# Patient Record
Sex: FEMALE | Race: WHITE | URBAN - METROPOLITAN AREA
[De-identification: names, ages, dates, MRNs, and addresses within clinical notes are randomized per-mention and may not be internally consistent; named-entity substitution may affect disease eponyms.]

---

## 2018-02-01 ENCOUNTER — HOSPITAL ENCOUNTER (EMERGENCY)
Facility: CLINIC | Age: 58
Discharge: LEFT BEFORE DISCHARGE | End: 2018-02-01
Payer: MEDICARE

## 2018-02-01 VITALS
OXYGEN SATURATION: 96 % | WEIGHT: 203 LBS | DIASTOLIC BLOOD PRESSURE: 65 MMHG | SYSTOLIC BLOOD PRESSURE: 108 MMHG | RESPIRATION RATE: 20 BRPM | TEMPERATURE: 97.3 F | HEART RATE: 118 BPM

## 2018-02-01 RX ORDER — OXYCODONE HCL 5 MG/5 ML
5 SOLUTION, ORAL ORAL EVERY 8 HOURS PRN
COMMUNITY

## 2018-02-01 RX ORDER — ERGOCALCIFEROL 1.25 MG/1
50000 CAPSULE, LIQUID FILLED ORAL WEEKLY
COMMUNITY

## 2018-02-01 RX ORDER — ARIPIPRAZOLE ORAL 1 MG/ML
5 SOLUTION ORAL DAILY
COMMUNITY

## 2018-02-01 RX ORDER — PROMETHAZINE HYDROCHLORIDE 25 MG/1
TABLET ORAL EVERY 6 HOURS PRN
COMMUNITY

## 2018-02-01 NOTE — ED NOTES
Pt presents having c/o's of bilateral low back pain. Pt reports having an extensive surgical hx. Pt is A&O, ABC's intact.

## 2018-03-31 ENCOUNTER — HEALTH MAINTENANCE LETTER (OUTPATIENT)
Age: 58
End: 2018-03-31

## 2018-04-05 ENCOUNTER — PRE VISIT (OUTPATIENT)
Dept: UROLOGY | Facility: CLINIC | Age: 58
End: 2018-04-05

## 2018-04-05 NOTE — TELEPHONE ENCOUNTER
PREVISIT INFORMATION                                                    Lisbet Sanchez scheduled for future visit at [facility] specialty clinics.    Patient is scheduled to see Eileen Rodríguez PA-C on 4/13/18  Reason for visit: Recurrent UTI's  Referring provider: None  Has patient seen previous specialist? Pt currently hospitalized at [facility]  Medical Records:  [facility] ([facility])    REVIEW                                                      New patient packet mailed to patient: Yes  Medication reconciliation complete: No      PLAN/FOLLOW-UP NEEDED                                                      Patient Reminders Given:    Informed patient to bring an updated list of allergies, medications, pharmacy details and insurance information. Directed patient to come to the 2nd floor, check-in D for their appointment. Informed patient to call back if appointment needs to be cancelled or rescheduled at [phone].    Reminded patient to bring any outside records regarding this appointment or have them faxed to clinic at [phone].    Reminded patient to complete and bring in urology questionnaire. Also for patient to please come with a full bladder and to ask , if early to get staff member for sample.

## 2018-04-13 ENCOUNTER — OFFICE VISIT (OUTPATIENT)
Dept: UROLOGY | Facility: CLINIC | Age: 58
End: 2018-04-13
Payer: COMMERCIAL

## 2018-04-13 VITALS — DIASTOLIC BLOOD PRESSURE: 58 MMHG | SYSTOLIC BLOOD PRESSURE: 93 MMHG | HEART RATE: 65 BPM | OXYGEN SATURATION: 98 %

## 2018-04-13 DIAGNOSIS — R39.15 URINARY URGENCY: ICD-10-CM

## 2018-04-13 DIAGNOSIS — N39.41 URGE INCONTINENCE: Primary | ICD-10-CM

## 2018-04-13 DIAGNOSIS — N39.0 RECURRENT UTI: ICD-10-CM

## 2018-04-13 DIAGNOSIS — R35.0 URINARY FREQUENCY: ICD-10-CM

## 2018-04-13 LAB
ALBUMIN UR-MCNC: NEGATIVE MG/DL
APPEARANCE UR: CLEAR
BILIRUB UR QL STRIP: NEGATIVE
COLOR UR AUTO: NORMAL
GLUCOSE UR STRIP-MCNC: NEGATIVE MG/DL
HGB UR QL STRIP: NEGATIVE
KETONES UR STRIP-MCNC: NEGATIVE MG/DL
LEUKOCYTE ESTERASE UR QL STRIP: NEGATIVE
NITRATE UR QL: NEGATIVE
PH UR STRIP: 7 PH (ref 5–7)
SOURCE: NORMAL
SP GR UR STRIP: 1.01 (ref 1–1.03)
UROBILINOGEN UR STRIP-MCNC: NORMAL MG/DL (ref 0–2)

## 2018-04-13 PROCEDURE — 51798 US URINE CAPACITY MEASURE: CPT | Performed by: PHYSICIAN ASSISTANT

## 2018-04-13 PROCEDURE — 87109 MYCOPLASMA: CPT | Performed by: PHYSICIAN ASSISTANT

## 2018-04-13 PROCEDURE — 81003 URINALYSIS AUTO W/O SCOPE: CPT | Performed by: PHYSICIAN ASSISTANT

## 2018-04-13 PROCEDURE — 99203 OFFICE O/P NEW LOW 30 MIN: CPT | Mod: 25 | Performed by: PHYSICIAN ASSISTANT

## 2018-04-13 ASSESSMENT — PAIN SCALES - GENERAL: PAINLEVEL: EXTREME PAIN (8)

## 2018-04-13 NOTE — NURSING NOTE
Lisbet Sanchez's goals for this visit include:   Chief Complaint   Patient presents with     Consult     2nd opinion- Bladder infection       She requests these members of her care team be copied on today's visit information: Yes    PCP: Aracely Nguyen    Referring Provider:  No referring provider defined for this encounter.    Chief Complaint   Patient presents with     Consult     2nd opinion- Bladder infection       Initial BP 93/58 (BP Location: Right arm, Patient Position: Sitting, Cuff Size: Adult Regular)  Pulse 65  SpO2 98% There is no height or weight on file to calculate BMI.  Medication Reconciliation: complete    Do you need any medication refills at today's visit? No    post void residual  0mL

## 2018-04-13 NOTE — LETTER
Patient:  Lisbet Sanchez  :   1960  MRN:     [id number]        Ms.Collene CASTRO Sanchez  [address] [place] [id number]        2018    Dear ,    We are writing to inform you of your test results.  Your additional urine cultures (ureaplasma and mycoplasma) are negative.  Please call with any additional questions or concerns.    Resulted Orders   UA reflex to Microscopic and Culture   Result Value Ref Range    Color Urine Light Yellow     Appearance Urine Clear     Glucose Urine Negative NEG^Negative mg/dL    Bilirubin Urine Negative NEG^Negative    Ketones Urine Negative NEG^Negative mg/dL    Specific Gravity Urine 1.008 1.003 - 1.035    Blood Urine Negative NEG^Negative    pH Urine 7.0 5.0 - 7.0 pH    Protein Albumin Urine Negative NEG^Negative mg/dL    Urobilinogen mg/dL Normal 0.0 - 2.0 mg/dL    Nitrite Urine Negative NEG^Negative    Leukocyte Esterase Urine Negative NEG^Negative    Source Midstream Urine    Mycoplasma large colony culture   Result Value Ref Range    Specimen Description Unspecified Urine     Special Requests Specimen received in [employer]     Culture Micro No large colony Mycoplasma species isolated    Ureaplasma culture   Result Value Ref Range    Specimen Description Unspecified Urine     Special Requests Specimen received in [employer]     Culture Micro No Ureaplasma species isolated

## 2018-04-13 NOTE — PROGRESS NOTES
CC: urinary frequency, urgency, incontinence    HPI: It was my pleasure to see Ms. Lisbet Sanchez, a 57 year old year old female with PMH of chronic back pain s/p lumbar fusions, nerve ablations, and hip injections, maintained on high daily dose narcotic therapy, who presents via self-referral for evaluation and second opinion of the above. She previously followed with [facility] urology. From review of Care Everywhere records and history obtained from patient, summary of Ms. Sanchez's urologic history is as follows:    -h/o mid-urethral sling in 2015 for MYKEL   -c/o urgency and UUI postop; maintained on oxybutynin (failed several other anticholinergics)  -Hospitalization 02/01/2018 - 02/05/2018 for c/o back pain, nausea, vomiting and diarrhea. Developed worsening urgency and urge incontinence during admission. Found to have a UTI. UC ultimately grew out pansensitive E coli. Treated with Levaquin. Repeat UA negative.  -Worsening urgency/UUI and well as dysuria since leaving the hospital. Tried increased oxybutynin to 10 mg BID but developed obstructive voiding symptoms (PVR in clinic was 12 cc).   -Cystoscopy in March 2018 showed a red spot in the bladder which was biopsied. Pathology was negative for malignancy.   -Recommended to undergo UDS and consider Botox pending results. Scheduled in the next few weeks with [facility].  -Also started on daily low dose nitrofurantion for UTI prophylaxis.   -Another hospitalization 3/29/18-4/12/18 for acute on chronic back pain with question for saddle anesthesia. MRI showed new severe spinal canal narrowing and new severe left lateral recess narrowing at L5-S1 region. Underwent L5 hemilaminectomy with farminotomy and decompression. Currently residing in a TCU. She reports no change in urinary symptoms since spine surgery.  -Urine cytology on 3/20/18 negative for high grade urothelial carcinoma.  -UC 4/3/18 with no growth    Here today for a second opinion.  Has ongoing urinary frequency (voiding nearly every 30 minutes), urgency, and sudden large volume urge incontinence. Also with recent onset of nocturnal enuresis. Has no control over her bladder.   Reports that oxybutynin, pyridium, and nitrofurantoin were discontinued while hospitalized and she is not currently taking any of these. Did not feel that oxybutynin was helping anyway.   Reports having UDS over 1 year ago.     No past medical history on file.   Fever 04/05/2018   Spinal stenosis of lumbar region with radiculopathy 04/01/2018   Opioid dependence with opioid-induced mood disorder (HRC) 03/31/2018   Hepatic steatosis (HRC) 03/31/2018   Exacerbation of chronic back pain 03/31/2018   Vulvar pain 03/31/2018   Saddle anesthesia 03/30/2018   Left flank pain 03/20/2018   Urinary incontinence without sensory awareness 03/20/2018   Antibiotic-associated diarrhea 03/20/2018   Dermatochalasis of eyelid 03/09/2018   Overview:     Added automatically from request for surgery [id number]     Visual field defect 03/09/2018   Overview:     Added automatically from request for surgery [id number]     Constipation 02/10/2018   Generalized weakness 02/08/2018   Nausea 02/07/2018   Left ventricular outflow tract obstruction 02/07/2018   Troponin level elevated 02/01/2018   Chronic narcotic use 02/01/2018   Enteritis 02/01/2018   Trochanteric bursitis of both hips 12/22/2017   Postablative hypothyroidism 03/01/2016   Overview:     2004, for toxic multinodular goiter       Mixed stress and urge urinary incontinence 03/01/2016   Controlled substance agreement signed 03/22/2015   Overview:     Diagnosis: Chronic back pain, anxiety  Medication: MS Contin 60 mg tabs #60 per month, oxycodone 5 mg #90 per month, 1 mg clonazepam #60 per month  Controlled Substance Agreement reviewed and signed: yes   Date agreement signed: 3/16/2015   Refill plan: refill monthly; next office visit due August 2018  Clinician: MD Buster Mark  09/27/2013   Blepharochalasis of left upper eyelid 09/27/2013   HTN (hypertension) ([facility]) 04/30/2013   Headache, migraine 10/02/2012   Vitamin D deficiency ([facility]) 03/30/2010   Major depressive disorder, recurrent episode, moderate ([facility]) 11/13/2008   Overview:     Depression Major Recurrent Moderate     Anxiety state ([facility]) 11/13/2008   Overview:     Anxiety NOS     Obesity (BMI 30-39.9) ([facility]) 10/24/2006   Chronic bilateral low back pain ([facility]) 06/07/2003   Overview:     LW Modifier: s/p lumbar fusion  ; Pain Low Back           No past surgical history on file.   Surgery Date Laterality Comments   HYSTERECTOMY 1997       BACK SURGERY 2000, 2010       KNEE SURGERY         HIP SURGERY         INCONTINENCE SURGERY 1997   sling removal 1998   SALPINGO-OOPHORECTOMY 2011   for benign ovarian mass         FAMILY HISTORY: Denies family history of urologic cancer.     SOCIAL HISTORY: non-smoker, minimal alcohol.   reports that she has never smoked. She has never used smokeless tobacco.    Current Outpatient Prescriptions   Medication Sig Dispense Refill     ARIPiprazole (ABILIFY) 1 MG/ML SOLN solution Take 5 mg by mouth daily       Calcium carb-Vitamin D 500 mg [place]-200 units (OSCAL WITH D;OYSTER SHELL CALCIUM) 500-200 MG-UNIT per tablet Take 1 tablet by mouth 2 times daily (with meals)       calcium-vitamin D 500-125 MG-UNIT TABS Take 2,000 Units by mouth daily       ClonazePAM (KLONOPIN PO) Take 1 mg by mouth as needed for anxiety       CYANOCOBALAMIN PO Take 500 mcg by mouth daily       Docusate Sodium (COLACE PO) Take 100 mg by mouth 2 times daily       DULoxetine HCl (CYMBALTA PO) Take 60 mg by mouth daily       vitamin D (ERGOCALCIFEROL) 70674 UNIT capsule Take 50,000 Units by mouth once a week       Gabapentin (NEURONTIN PO) Take 600 mg by mouth 3 times daily       Levothyroxine Sodium (SYNTHROID PO) Take 175 mcg by mouth       Morphine Sulfate (MS CONTIN PO) Take 60 mg by mouth every 12 hours       Naproxen Sodium  (ANAPROX PO) Take 220 mg by mouth daily       oxyCODONE (ROXICODONE) 5 MG/5ML solution Take 5 mg by mouth every 8 hours as needed for moderate to severe pain       promethazine (PHENERGAN) 25 MG tablet Take by mouth every 6 hours as needed for nausea       SUMAtriptan Succinate (IMITREX PO) Take 100 mg by mouth as needed for migraine       Doxycycline Monohydrate (ADOXA PO) Take 100 mg by mouth       LACTULOSE PO Take 10 g by mouth 3 times daily       Lisinopril (ZESTRIL PO) Take 10 mg by mouth       Oxybutynin Chloride (DITROPAN XL PO) Take 5 mg by mouth         ALLERGIES: Keflex [cephalexin]      REVIEW OF SYSTEMS:  A 14 point review of systems was obtained and was positive for back pain, leg pain, HTN, chronic opioid use and otherwise negative aside from that mentioned above in the HPI.       GENERAL PHYSICAL EXAM:   Vitals: BP 93/58 (BP Location: Right arm, Patient Position: Sitting, Cuff Size: Adult Regular)  Pulse 65  SpO2 98%  There is no height or weight on file to calculate BMI.  GENERAL: Well groomed, well developed, well nourished female in NAD resting comfortably in a wheelchair.  HEAD: Normocephalic. Atraumatic.  RESPIRATORY: No increased respiratory effort.   GI: Soft, NT, ND, no palpable masses.  No CVAT bilaterally.  MS: Full ROM in extremities, gait normal, normal muscle tone  SKIN: Warm to touch, dry.  No visible rashes or lesions on examined areas.  NEURO: Alert and oriented x 3.  PSYCH: Normal mood and affect, pleasant and agreeable during interview and exam.  PELVIC:  Deferred for now.        PVR: Residual urine by ultrasound was 0 ml.      UA today is completely negative.     RADIOLOGY: The following tests were reviewed:   CT Chest, Abdomen, Pelvis w/IV Contrast   4/6/18   (via Care Everywhere)  IMPRESSION:    1. New area of groundglass infiltrate right lung.  2. Postsurgical changes lumbar spine including fluid collection and air as described above. Most likely postsurgical. Infection not  excluded.    LABS: The last test results for Ms. Lisbet Sanchez were reviewed.   Cr - 0.67   4/7/18   (via Care Everywhere)  Urine cytology on 3/20/18 negative for high grade urothelial carcinoma.  UC 4/3/18 with no growth    ASSESSMENT:   57 year old female with chronic back pain, most recently s/p L5 hemilaminectomy with foraminotomy and decompression, and severe urinary frequency, urgency, and urge incontinence which has been refractory to multiple medications. She was recommended by [facility] urology to undergo UDS and consider possible Botox pending results. Looking for second opinion. UA today is completely negative and PVR is 0 mL so she appears to be emptying adequately.    PLAN:   We discussed that given no evidence for infection currently, her symptoms are most likely secondary to OAB with possible neurogenic component from multiple spine surgeries. Also in the differential is incomplete bladder emptying with overflow incontinence 2/2 chronic narcotic pain medication. Agree with recommendation to repeat UDS. She has this scheduled with [facility] in the next few weeks, but requests to schedule with the [facility] as she is considering transferring her urology care.  -Schedule UDS next available. She will call to cancel if she decides to stick with [facility].   -Will send urine for ureaplasma/mycoplasma to rule out atypicals.     20 minutes spent with the patient, >50% in counseling and coordination of care.    Eileen Rodríguez PA-C  Department of Urology

## 2018-04-13 NOTE — PATIENT INSTRUCTIONS
URODYNAMIC TESTING      Where should I go for this test?  o The procedure is performed at:     [facility]   [address] [place] [id number]   Floor 4    o If you have questions about your test, please call our nurse triage line at [phone], option #2. If you need to cancel or reschedule your test for any reason, please notify us as soon as possible.    o Please check in approximately 15 minutes prior to your procedure time.        What is urodynamic testing?   o Urodynamic testing refers to a group of tests used to assess bladder function by measuring various aspects of urine storage and emptying. The test takes about 75 minutes. For most patients, the test is not painful.       How should I get ready for this test?  o Please do not drink anything for 2 hours prior to your test. If you need to take medication it is okay to have sips of water with your medicines.    o Please try to arrive with a comfortably full bladder if possible because you will be asked to try and urinate prior to your study.  o If you received a bladder diary, please complete this prior to your urodynamic test and bring it with you to your appointment. A bladder diary measures how much fluid you are drinking and how often and how much you are urinating. You can also record any urinary leakage that may have occurred and what you were doing when you leaked.    o If you have chronic constipation, please take stool softeners for two days before your test.      What happens during the test?  o You will first be asked to empty your bladder in a private restroom into a special toilet called a uroflow machine which measures the rate of urine flow.  o A nurse will then place a very small tube (called a catheter) into your bladder. This drains any urine left over after urinating and also measures the pressures inside of your bladder during your test. Another small catheter will then be  placed into your rectum to measure abdominal pressures. Two small sticky patches will be placed on the skin near your anus to measure pelvic floor function.   o We will then instill contrast dye into your bladder through the bladder catheter. The contrast is very dense and will allow us to take x-ray pictures of your bladder intermittently during your test.  o You will be asked to tell us when you first start to feel like your bladder is filling up, when you have moderate urgency to urinate, when you have very strong urgency to urinate and finally when you feel that your bladder is full. Once you feel full you will be asked to try and urinate.    o You may be asked to cough or bear down several times during your procedure. The provider running your study will be looking for urine leakage during this time.        What happens after the test?  o The provider running your urodynamic study will share the results of your test on the day of your procedure or very soon after.  o After your test, you may go about your day as normal. You may notice some blood in your urine for a couple of days which should clear up on its own. You may also feel a more urgent need to use the toilet or you may need to go more often - this is due to having a catheter placed and should resolve on its own in a few days.

## 2018-04-13 NOTE — MR AVS SNAPSHOT
After Visit Summary   4/13/2018    Lisbet Sanchez    MRN: [id number]           Patient Information     Date Of Birth          1960        Visit Information        Provider Department      4/13/2018 1:00 PM Eileen Rodríguez PA-C [facility]        Today's Diagnoses     Urinary incontinence, unspecified type    -  1      Care Instructions    URODYNAMIC TESTING      Where should I go for this test?  o The procedure is performed at:     [facility]   [address] [place] [address]   Floor 4    o If you have questions about your test, please call our nurse triage line at [phone], option #2. If you need to cancel or reschedule your test for any reason, please notify us as soon as possible.    o Please check in approximately 15 minutes prior to your procedure time.        What is urodynamic testing?   o Urodynamic testing refers to a group of tests used to assess bladder function by measuring various aspects of urine storage and emptying. The test takes about 75 minutes. For most patients, the test is not painful.       How should I get ready for this test?  o Please do not drink anything for 2 hours prior to your test. If you need to take medication it is okay to have sips of water with your medicines.    o Please try to arrive with a comfortably full bladder if possible because you will be asked to try and urinate prior to your study.  o If you received a bladder diary, please complete this prior to your urodynamic test and bring it with you to your appointment. A bladder diary measures how much fluid you are drinking and how often and how much you are urinating. You can also record any urinary leakage that may have occurred and what you were doing when you leaked.    o If you have chronic constipation, please take stool softeners for two days before your test.      What happens during the test?  o You will  first be asked to empty your bladder in a private restroom into a special toilet called a uroflow machine which measures the rate of urine flow.  o A nurse will then place a very small tube (called a catheter) into your bladder. This drains any urine left over after urinating and also measures the pressures inside of your bladder during your test. Another small catheter will then be placed into your rectum to measure abdominal pressures. Two small sticky patches will be placed on the skin near your anus to measure pelvic floor function.   o We will then instill contrast dye into your bladder through the bladder catheter. The contrast is very dense and will allow us to take x-ray pictures of your bladder intermittently during your test.  o You will be asked to tell us when you first start to feel like your bladder is filling up, when you have moderate urgency to urinate, when you have very strong urgency to urinate and finally when you feel that your bladder is full. Once you feel full you will be asked to try and urinate.    o You may be asked to cough or bear down several times during your procedure. The provider running your study will be looking for urine leakage during this time.        What happens after the test?  o The provider running your urodynamic study will share the results of your test on the day of your procedure or very soon after.  o After your test, you may go about your day as normal. You may notice some blood in your urine for a couple of days which should clear up on its own. You may also feel a more urgent need to use the toilet or you may need to go more often - this is due to having a catheter placed and should resolve on its own in a few days.            Follow-ups after your visit        Who to contact     If you have questions or need follow up information about today's clinic visit or your schedule please contact [facility] directly at [phone].  Normal or  non-critical lab and imaging results will be communicated to you by [employer], letter or phone within 4 business days after the clinic has received the results. If you do not hear from us within 7 days, please contact the clinic through [employer] (Closed)t or phone. If you have a critical or abnormal lab result, we will notify you by phone as soon as possible.  Submit refill requests through [employer] or call your pharmacy and they will forward the refill request to us. Please allow 3 business days for your refill to be completed.          Additional Information About Your Visit        [employer] Information     [employer] is an electronic gateway that provides easy, online access to your medical records. With [employer], you can request a clinic appointment, read your test results, renew a prescription or communicate with your care team.     To sign up for [employer] visit the website at [email or web address]   You will be asked to enter the access code listed below, as well as some personal information. Please follow the directions to create your username and password.     Your access code is: [email or web address]  Expires: 2018  1:59 PM     Your access code will  in 90 days. If you need help or a new code, please contact your [facility] or call [phone] for assistance.        Care EveryWhere ID     This is your Care EveryWhere ID. This could be used by other organizations to access your [facility] medical records  [id number]        Your Vitals Were     Pulse Pulse Oximetry                65 98%           Blood Pressure from Last 3 Encounters:   18 93/58   18 108/65    Weight from Last 3 Encounters:   18 92.1 kg (203 lb)              We Performed the Following     MEASURE POST-VOID RESIDUAL URINE/BLADDER CAPACITY, US NON-IMAGING     Mycoplasma large colony culture     UA reflex to Microscopic and Culture     Ureaplasma culture        Primary Care Provider Office Phone # Fax #     Aracely Nguyen [phone]       [facility] [address] [place] [address]        Equal Access to Services     DEMETRIA LONG : Hadii aad ku hadturnero Sorayneali, waaxda luqadaha, qaybta kaalmada adeegyada, oscar thibodeaux. So [facility] [phone].    ATENCIÓN: Si habla español, tiene a stallings disposición servicios gratuitos de asistencia lingüística. Llame al [phone].    We comply with applicable federal civil rights laws and [place] laws. We do not discriminate on the basis of race, color, national origin, age, disability, sex, sexual orientation, or gender identity.            Thank you!     Thank you for choosing [facility]  for your care. Our goal is always to provide you with excellent care. Hearing back from our patients is one way we can continue to improve our services. Please take a few minutes to complete the written survey that you may receive in the mail after your visit with us. Thank you!             Your Updated Medication List - Protect others around you: Learn how to safely use, store and throw away your medicines at www.disposemymeds.org.          This list is accurate as of 4/13/18  1:59 PM.  Always use your most recent med list.                   Brand Name Dispense Instructions for use Diagnosis    ADOXA PO      Take 100 mg by mouth        ANAPROX PO      Take 220 mg by mouth daily        ARIPiprazole 1 MG/ML Soln solution    ABILIFY     Take 5 mg by mouth daily        Calcium carb-Vitamin D 500 mg [place]-200 units 500-200 MG-UNIT per tablet    OSCAL with D;Oyster Shell Calcium     Take 1 tablet by mouth 2 times daily (with meals)        calcium-vitamin D 500-125 MG-UNIT Tabs      Take 2,000 Units by mouth daily        COLACE PO      Take 100 mg by mouth 2 times daily        CYANOCOBALAMIN PO      Take 500 mcg by mouth daily        CYMBALTA PO      Take 60 mg by mouth daily        DITROPAN XL PO      Take 5 mg by mouth         IMITREX PO      Take 100 mg by mouth as needed for migraine        KLONOPIN PO      Take 1 mg by mouth as needed for anxiety        LACTULOSE PO      Take 10 g by mouth 3 times daily        MS CONTIN PO      Take 60 mg by mouth every 12 hours        NEURONTIN PO      Take 600 mg by mouth 3 times daily        oxyCODONE 5 MG/5ML solution    ROXICODONE     Take 5 mg by mouth every 8 hours as needed for moderate to severe pain        promethazine 25 MG tablet    PHENERGAN     Take by mouth every 6 hours as needed for nausea        SYNTHROID PO      Take 175 mcg by mouth        vitamin D 55658 UNIT capsule    ERGOCALCIFEROL     Take 50,000 Units by mouth once a week        ZESTRIL PO      Take 10 mg by mouth

## 2018-04-20 LAB
BACTERIA SPEC CULT: NORMAL
BACTERIA SPEC CULT: NORMAL
Lab: NORMAL
Lab: NORMAL
SPECIMEN SOURCE: NORMAL
SPECIMEN SOURCE: NORMAL

## 2018-05-17 ENCOUNTER — PRE VISIT (OUTPATIENT)
Dept: UROLOGY | Facility: CLINIC | Age: 58
End: 2018-05-17

## 2019-04-03 ENCOUNTER — HOSPITAL ENCOUNTER (EMERGENCY)
Facility: CLINIC | Age: 59
Discharge: HOME | End: 2019-04-03
Attending: EMERGENCY MEDICINE | Admitting: EMERGENCY MEDICINE
Payer: COMMERCIAL

## 2019-04-03 VITALS
OXYGEN SATURATION: 97 % | SYSTOLIC BLOOD PRESSURE: 152 MMHG | HEART RATE: 60 BPM | RESPIRATION RATE: 16 BRPM | BODY MASS INDEX: 34.84 KG/M2 | HEIGHT: 64 IN | TEMPERATURE: 98.1 F | DIASTOLIC BLOOD PRESSURE: 77 MMHG

## 2019-04-03 DIAGNOSIS — M54.41 ACUTE RIGHT-SIDED LOW BACK PAIN WITH RIGHT-SIDED SCIATICA: ICD-10-CM

## 2019-04-03 PROCEDURE — 25000128 H RX IP 250 OP 636: Performed by: EMERGENCY MEDICINE

## 2019-04-03 PROCEDURE — 99285 EMERGENCY DEPT VISIT HI MDM: CPT | Mod: 25

## 2019-04-03 PROCEDURE — 96372 THER/PROPH/DIAG INJ SC/IM: CPT

## 2019-04-03 RX ORDER — PREDNISONE 10 MG/1
TABLET ORAL
Qty: 32 TABLET | Refills: 0 | Status: SHIPPED | OUTPATIENT
Start: 2019-04-03

## 2019-04-03 RX ADMIN — HYDROMORPHONE HYDROCHLORIDE 1 MG: 1 INJECTION, SOLUTION INTRAMUSCULAR; INTRAVENOUS; SUBCUTANEOUS at 15:53

## 2019-04-03 ASSESSMENT — ENCOUNTER SYMPTOMS
BACK PAIN: 1
NUMBNESS: 0
WEAKNESS: 0

## 2019-04-03 NOTE — ED AVS SNAPSHOT
Emergency Department  [address] [place] [address]  Phone:  [phone]  Fax:  [phone]                                    Lisbet Sanchez   MRN: [id number]    Department:   Emergency Department   Date of Visit:  4/3/2019           After Visit Summary Signature Page    I have received my discharge instructions, and my questions have been answered. I have discussed any challenges I see with this plan with the nurse or doctor.    ..........................................................................................................................................  Patient/Patient Representative Signature      ..........................................................................................................................................  Patient Representative Print Name and Relationship to Patient    ..................................................               ................................................  Date                                   Time    ..........................................................................................................................................  Reviewed by Signature/Title    ...................................................              ..............................................  Date                                               Time          22EPIC Rev 08/18

## 2019-04-03 NOTE — ED PROVIDER NOTES
History     Chief Complaint:  Right leg pain    The history is provided by the patient.      Lisbet Sanchez is a 58 year old female with a history of HTN, chronic back pain, chronic narcotic use, controlled substance agreement signed,and several back surgeries who presents to the emergency department for evaluation of right leg pain. Five days ago while helping her mother try on some pants in a store, she bent over and states she had the onset back pain that shoots down into her right buttocks and right leg. She thinks she has pinched a nerve. She has a history of back pain and does have a spinal stimulator in place, which helps with the back pain but she is concerned and uncomfortable with the pain in her right leg. This prompted the patient to seek evaluation here in the emergency department. She denies loss of bladder or bowel control. No saddle paresthesias. She denies any numbness or weakness. She has been able to ambulate but does have some pain with this. No abdominal pain. To note, the patient does take 3 tablets of her morphine sulfate prescribed through her pain clinic daily. She has her next appointment on April 29.     Allergies:  Cephalexin  Vancomycin     Medications:    Abilify  Vitamin D  Klonopin  Cyanocobalamin  Colace  Adoxa  Cymbalta  Gabapentin  Lactulose  Synthroid   Lisinopril  Morphine  Oxycodone  Promethazine  Imitrex  Ambien  Promethazine  Medi-phedrine    Past Medical History:    UTI  Chronic back pain  Ovarian cyst  Hypothyroidism  Depression  HTN  Toxic multinodular goiter without mention of thyroid crisis or storm  Migraines  Anxiety  KEITH  DDD  Central sleep apnea  Spinal abscess  Spinal stenosis  Chronic narcotic use  Controlled substance agreement signed  Kidney stones  Acute pulmonary edema    Past Surgical History:    Lumbar fusion  Lumbar laminectomy    Tubal ligation  Hysterectomy  Knee replacement  Bilateral salpingo-oophorectomy    Family History:    Breast  "cancer  Colon cancer    Social History:  Negative for tobacco use.  Positive for alcohol use.  Negative for drug use.  Marital Status:        Review of Systems   Gastrointestinal:        Negative for loss of bowel.   Genitourinary:        Negative for loss of bladder control.   Musculoskeletal: Positive for back pain.        Positive for right leg pain.   Neurological: Negative for weakness and numbness.   All other systems reviewed and are negative.    Physical Exam     Patient Vitals for the past 24 hrs:   BP Temp Temp src Pulse Resp SpO2 Height   04/03/19 1256 152/77 98.1  F (36.7  C) Oral 60 16 97 % 1.626 m (5' 4\")     Physical Exam  Eye:  Pupils are equal, round, and reactive.  Extraocular movements intact.    ENT:  No rhinorrhea.  Moist mucus membranes.  Normal tongue and tonsil.    Cardiac:  Regular rate and rhythm.  No murmurs, gallops, or rubs.    Pulmonary:  Clear to auscultation bilaterally.  No wheezes, rales, or rhonchi.    Abdomen:  Positive bowel sounds.  Abdomen is soft and non-distended, without focal tenderness.    Musculoskeletal:  No midline tenderness to the spine.  There is focal tenderness along the right side of the lower back with extension into the right buttock and sciatic notch.  - straight leg raise on the left, + increase in pain with straight leg raise on the right.     Skin:  Warm and dry without rashes.  Feet are equally warm with strong DP pulses.    Neuro:  Normal sensation throughout the legs and perineum.  5/5 strength through the hips/knees/ankles.  2+ patellar reflexes.  Normal gait.    Psych:  Normal affect with appropriate interaction with examiner.        Emergency Department Course   Interventions:  1553 Dilaudid, 1 mg, IV injection    Emergency Department Course:  1520 Nursing notes and vitals reviewed.  I performed an exam of the patient as documented above.     IV inserted. Medicine administered as documented above.    1600 I rechecked the patient and discussed " the results of her workup thus far.     Findings and plan explained to the Patient. Patient discharged home with instructions regarding supportive care, medications, and reasons to return. The importance of close follow-up was reviewed. The patient was prescribed Prednisone.    I personally answered all related questions prior to discharge.   Impression & Plan    Medical Decision Making:  This unfortunate 58-year-old woman with an extensive history of low back pain including multiple surgeries along with management of her pain through a local pain clinic with 3 times daily dosing of oral morphine presents to us with complaints of an exacerbation of this low back pain.  She describes bending down to help her mother try on a pair of pants approximately 5 days ago.  She felt a pull in the low back and since then, she has had increasing constant aching pain in the right lower back and buttock with some aching sensation into the leg.  The patient denies trauma.  She denies weakness or numbness.  She denies incontinence.  On exam, there is no midline tenderness.  With this, I feel there is a very low likelihood for a compression fracture or other type of fracture in the spine and an x-ray would not be indicated.  She states that she called her pain clinic and was sent here for an MRI, though she meets no criteria for a an emergent MRI and we discussed this.  Plan instead will be for conservative management with a course of prednisone, bolus and tapering along with continued medications through her pain clinic.  I gave her a single dose of pain medication here with improvement in symptoms.  She has an appointment with her pain clinic scheduled in 4 weeks and I advised her to call them today to try to move that appointment up as she may benefit from reinstituting physical therapy and possible cortisone injection.  Patient's questions were answered and she is comfortable with the plan for discharge home.  She knows that she  can return at any point if she has worsening of her condition, inability to care for self, or any other emergent concerns.    Diagnosis:    ICD-10-CM    1. Acute right-sided low back pain with right-sided sciatica M54.41      Disposition:  discharged to home    Discharge Medications:     Medication List      Started    predniSONE 10 MG tablet  Commonly known as:  DELTASONE  4 tabs po every day x 3 days, then 3 tabs po every day x 3 days, then 2 tabs po every day x 3 days, then 1 tab po every day x 3 days, then 1/2 tab po every day x 4 days.          Scribe Disclosure:  I, Mary Walter, am serving as a scribe on 4/3/2019 at 3:28 PM to personally document services performed by Trierweiler, Chad A, MD based on my observations and the provider's statements to me.     Mary Walter  4/3/2019    EMERGENCY DEPARTMENT       Trierweiler, Chad A, MD  04/04/19 6295

## 2022-09-02 ENCOUNTER — HOSPITAL ENCOUNTER (EMERGENCY)
Facility: CLINIC | Age: 62
Discharge: HOME | End: 2022-09-02
Payer: MEDICARE

## 2024-12-13 ENCOUNTER — LAB REQUISITION (OUTPATIENT)
Dept: LAB | Facility: CLINIC | Age: 64
End: 2024-12-13
Payer: COMMERCIAL

## 2024-12-13 DIAGNOSIS — I50.32 CHRONIC DIASTOLIC (CONGESTIVE) HEART FAILURE (H): ICD-10-CM

## 2024-12-16 ENCOUNTER — TELEPHONE (OUTPATIENT)
Dept: GERIATRICS | Facility: CLINIC | Age: 64
End: 2024-12-16

## 2024-12-16 DIAGNOSIS — F41.9 ANXIETY: Primary | ICD-10-CM

## 2024-12-16 LAB
ANION GAP SERPL CALCULATED.3IONS-SCNC: 11 MMOL/L (ref 7–15)
BUN SERPL-MCNC: 16.5 MG/DL (ref 8–23)
CALCIUM SERPL-MCNC: 9.2 MG/DL (ref 8.8–10.4)
CHLORIDE SERPL-SCNC: 103 MMOL/L (ref 98–107)
CREAT SERPL-MCNC: 0.66 MG/DL (ref 0.51–0.95)
EGFRCR SERPLBLD CKD-EPI 2021: >90 ML/MIN/1.73M2
ERYTHROCYTE [DISTWIDTH] IN BLOOD BY AUTOMATED COUNT: 14.5 % (ref 10–15)
GLUCOSE SERPL-MCNC: 98 MG/DL (ref 70–99)
HCO3 SERPL-SCNC: 28 MMOL/L (ref 22–29)
HCT VFR BLD AUTO: 30.4 % (ref 35–47)
HGB BLD-MCNC: 9.3 G/DL (ref 11.7–15.7)
MCH RBC QN AUTO: 27.8 PG (ref 26.5–33)
MCHC RBC AUTO-ENTMCNC: 30.6 G/DL (ref 31.5–36.5)
MCV RBC AUTO: 91 FL (ref 78–100)
PLATELET # BLD AUTO: 263 10E3/UL (ref 150–450)
POTASSIUM SERPL-SCNC: 4.5 MMOL/L (ref 3.4–5.3)
RBC # BLD AUTO: 3.34 10E6/UL (ref 3.8–5.2)
SODIUM SERPL-SCNC: 142 MMOL/L (ref 135–145)
WBC # BLD AUTO: 8.2 10E3/UL (ref 4–11)

## 2024-12-16 PROCEDURE — 36415 COLL VENOUS BLD VENIPUNCTURE: CPT | Mod: ORL | Performed by: FAMILY MEDICINE

## 2024-12-16 PROCEDURE — 85027 COMPLETE CBC AUTOMATED: CPT | Mod: ORL | Performed by: FAMILY MEDICINE

## 2024-12-16 PROCEDURE — P9604 ONE-WAY ALLOW PRORATED TRIP: HCPCS | Mod: ORL | Performed by: FAMILY MEDICINE

## 2024-12-16 PROCEDURE — 80048 BASIC METABOLIC PNL TOTAL CA: CPT | Mod: ORL | Performed by: FAMILY MEDICINE

## 2024-12-16 RX ORDER — CLONAZEPAM 0.5 MG/1
0.5 TABLET ORAL 3 TIMES DAILY PRN
COMMUNITY
Start: 2024-12-16 | End: 2024-12-16

## 2024-12-16 RX ORDER — CLONAZEPAM 0.5 MG/1
0.5 TABLET ORAL 3 TIMES DAILY PRN
Qty: 30 TABLET | Refills: 1 | Status: SHIPPED | OUTPATIENT
Start: 2024-12-16

## 2024-12-16 NOTE — TELEPHONE ENCOUNTER
[facility] Triage Nurse Telephone Encounter    Provider: ALEXANDRA Silva  Facility: [facility]  Facility Type:  TCU    Caller: Laura  Call Back Number: [phone]    Allergies:    Allergies   Allergen Reactions    Cephalexin Anaphylaxis, Hives and Rash    Vancomycin Other (See Comments)     neutropenia        Reason for call: Nurse is calling to report that patient has orders for Clonazepam 0.5mg tablet---1 tab TID PRN for anxiety.  Patient did not come from the hospital with a script for this.  Epic has that patient's Clonazepam order is for the ODT tablet.      Verbal Order/Direction given by Provider: Discontinue the order for Clonazepam ODT tablet.  Start Clonazepam 0.5mg tablet---1 tablet TID PRN for anxiety.      Provider giving Order:  ALEXANDRA Silva    Verbal Order given to: Laura Herrera RN

## 2024-12-17 ENCOUNTER — TRANSITIONAL CARE UNIT VISIT (OUTPATIENT)
Dept: GERIATRICS | Facility: CLINIC | Age: 64
End: 2024-12-17
Payer: COMMERCIAL

## 2024-12-17 VITALS
HEART RATE: 74 BPM | OXYGEN SATURATION: 96 % | RESPIRATION RATE: 18 BRPM | DIASTOLIC BLOOD PRESSURE: 74 MMHG | BODY MASS INDEX: 35.05 KG/M2 | HEIGHT: 64 IN | WEIGHT: 205.3 LBS | TEMPERATURE: 97.8 F | SYSTOLIC BLOOD PRESSURE: 148 MMHG

## 2024-12-17 DIAGNOSIS — F41.8 DEPRESSION WITH ANXIETY: ICD-10-CM

## 2024-12-17 DIAGNOSIS — M54.6 CHRONIC BILATERAL THORACIC BACK PAIN: ICD-10-CM

## 2024-12-17 DIAGNOSIS — N32.81 OVERACTIVE BLADDER: ICD-10-CM

## 2024-12-17 DIAGNOSIS — G47.33 OSA (OBSTRUCTIVE SLEEP APNEA): ICD-10-CM

## 2024-12-17 DIAGNOSIS — Z98.890 S/P INSERTION OF INTRATHECAL PUMP: ICD-10-CM

## 2024-12-17 DIAGNOSIS — F11.90 CHRONIC, CONTINUOUS USE OF OPIOIDS: ICD-10-CM

## 2024-12-17 DIAGNOSIS — R29.898 RIGHT LEG WEAKNESS: ICD-10-CM

## 2024-12-17 DIAGNOSIS — G89.29 CHRONIC BILATERAL THORACIC BACK PAIN: ICD-10-CM

## 2024-12-17 DIAGNOSIS — I50.30 HEART FAILURE WITH PRESERVED EJECTION FRACTION, NYHA CLASS I (H): ICD-10-CM
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
## 2024-12-17 DIAGNOSIS — G43.909 MIGRAINE WITHOUT STATUS MIGRAINOSUS, NOT INTRACTABLE, UNSPECIFIED MIGRAINE TYPE: ICD-10-CM

## 2024-12-17 DIAGNOSIS — E78.2 MIXED HYPERLIPIDEMIA: ICD-10-CM

## 2024-12-17 DIAGNOSIS — T40.2X5A OPIOID-INDUCED CONSTIPATION: ICD-10-CM

## 2024-12-17 DIAGNOSIS — I10 HYPERTENSION, UNSPECIFIED TYPE: ICD-10-CM

## 2024-12-17 DIAGNOSIS — E03.9 ACQUIRED HYPOTHYROIDISM: ICD-10-CM

## 2024-12-17 DIAGNOSIS — F41.9 ANXIETY: ICD-10-CM

## 2024-12-17 DIAGNOSIS — K59.03 OPIOID-INDUCED CONSTIPATION: ICD-10-CM

## 2024-12-17 DIAGNOSIS — I42.2 HYPERTROPHIC CARDIOMYOPATHY (H): ICD-10-CM

## 2024-12-17 DIAGNOSIS — Z98.1 S/P SPINAL FUSION: ICD-10-CM

## 2024-12-17 PROBLEM — N39.42 URINARY INCONTINENCE WITHOUT SENSORY AWARENESS: Status: ACTIVE | Noted: 2018-03-20

## 2024-12-17 PROBLEM — M19.90 ARTHRITIS: Status: ACTIVE | Noted: 2024-06-17

## 2024-12-17 PROBLEM — M70.62 TROCHANTERIC BURSITIS OF BOTH HIPS: Status: ACTIVE | Noted: 2022-11-21

## 2024-12-17 PROBLEM — M48.07 LUMBOSACRAL STENOSIS: Status: ACTIVE | Noted: 2020-07-15

## 2024-12-17 PROBLEM — M51.369 DEGENERATION OF INTERVERTEBRAL DISC OF LUMBAR REGION: Status: ACTIVE | Noted: 2018-09-02

## 2024-12-17 PROBLEM — S22.39XA RIB FRACTURE: Status: ACTIVE | Noted: 2024-06-17
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
## 2024-12-17 PROBLEM — K59.09 CHRONIC CONSTIPATION: Status: ACTIVE | Noted: 2024-11-27

## 2024-12-17 PROBLEM — M96.0 PSEUDARTHROSIS FOLLOWING SPINAL FUSION: Status: ACTIVE | Noted: 2020-07-31

## 2024-12-17 PROBLEM — G47.00 INSOMNIA: Status: ACTIVE | Noted: 2024-11-27

## 2024-12-17 PROBLEM — M47.22 CERVICAL RADICULOPATHY DUE TO DEGENERATIVE JOINT DISEASE OF SPINE: Status: ACTIVE | Noted: 2020-07-15

## 2024-12-17 PROBLEM — M54.17 RADICULOPATHY, LUMBOSACRAL REGION: Status: ACTIVE | Noted: 2020-07-15

## 2024-12-17 PROBLEM — F11.20 OPIOID DEPENDENCE, UNCOMPLICATED (H): Status: ACTIVE | Noted: 2023-05-19

## 2024-12-17 PROBLEM — D64.9 NORMOCYTIC ANEMIA: Status: ACTIVE | Noted: 2024-10-12

## 2024-12-17 PROBLEM — R39.15 URINARY URGENCY: Status: ACTIVE | Noted: 2021-06-28

## 2024-12-17 PROBLEM — M70.61 TROCHANTERIC BURSITIS OF BOTH HIPS: Status: ACTIVE | Noted: 2022-11-21

## 2024-12-17 PROBLEM — J90 PLEURAL EFFUSION: Status: ACTIVE | Noted: 2024-10-05

## 2024-12-17 PROBLEM — M25.562 CHRONIC PAIN OF BOTH KNEES: Status: ACTIVE | Noted: 2022-11-21

## 2024-12-17 PROBLEM — K76.0 HEPATIC STEATOSIS: Chronic | Status: ACTIVE | Noted: 2018-03-31

## 2024-12-17 PROBLEM — M54.50 ACUTE MIDLINE LOW BACK PAIN: Status: ACTIVE | Noted: 2020-06-14

## 2024-12-17 PROBLEM — Q24.8 LEFT VENTRICULAR OUTFLOW TRACT OBSTRUCTION: Status: ACTIVE | Noted: 2018-02-07

## 2024-12-17 PROBLEM — N39.0 RECURRENT URINARY TRACT INFECTION: Status: ACTIVE | Noted: 2017-06-11

## 2024-12-17 PROBLEM — Q24.8 OTHER SPECIFIED CONGENITAL MALFORMATIONS OF HEART: Status: ACTIVE | Noted: 2018-02-07

## 2024-12-17 PROBLEM — G89.18 POST-OP PAIN: Status: ACTIVE | Noted: 2024-10-10

## 2024-12-17 PROBLEM — R52 ACUTE PAIN: Status: ACTIVE | Noted: 2024-10-10

## 2024-12-17 PROBLEM — R30.0 DYSURIA: Status: ACTIVE | Noted: 2024-11-19

## 2024-12-17 PROBLEM — Z86.69 HISTORY OF MIGRAINE: Status: ACTIVE | Noted: 2024-11-27

## 2024-12-17 PROBLEM — R39.89: Status: ACTIVE | Noted: 2022-09-21

## 2024-12-17 PROBLEM — K81.0 ACUTE CHOLECYSTITIS: Status: ACTIVE | Noted: 2024-01-20

## 2024-12-17 PROBLEM — M96.1 FAILED BACK SYNDROME OF THORACIC SPINE: Status: ACTIVE | Noted: 2024-10-05

## 2024-12-17 PROBLEM — M25.511 CHRONIC RIGHT SHOULDER PAIN: Status: ACTIVE | Noted: 2022-11-21

## 2024-12-17 PROBLEM — R82.81 PYURIA: Status: ACTIVE | Noted: 2024-11-19

## 2024-12-17 PROBLEM — M25.561 CHRONIC PAIN OF BOTH KNEES: Status: ACTIVE | Noted: 2022-11-21

## 2024-12-17 PROBLEM — M48.061 SPINAL STENOSIS OF LUMBAR REGION: Status: ACTIVE | Noted: 2018-04-01

## 2024-12-17 PROCEDURE — 99309 SBSQ NF CARE MODERATE MDM 30: CPT

## 2024-12-17 NOTE — PROGRESS NOTES
"[facility] GERIATRICS    Chief Complaint   Patient presents with    RECHECK     HPI:  Lisbet Sanchez is a 64 year old  (1960), who is being seen today for an episodic care visit at: [facility] ([facility]) [[id number]]. Today's concern is:  yovanny Sanchez  is a 64 year old  (1960), admitted to the above facility from  [facility]. Hospital stay 11/28/2024 through 12/12/2024..     HPI:    Lisbet Sanchez is a 64 y.o. with a history of chronic pain with opioid dependence and intrathecal pain pump, hypertension, depression with anxiety, hypothyroidism, obstructive sleep apnea, chronic back pain with revision of spinal fusion (T12-L1 by Dr. Moore 10/7/2024) who was admitted on 11/28/2024 for a fall and weakness. She had an extensive history including many hospitalizations between 10/28 and 11/26/24.     Per chart reivew, she presented to the ED following a fall on 11/28/24. She had been seen by home hospital program earlier in the day and reported feeling \"okay\", reportedly endorsed some weakness and fatigue. Did note desaturating when ambulating but dyspnea/orthopnea improved. Later in the evening, she was going to go to bed when her Right leg became numb and gave out. She fell backwards but denied hitting her head or lose consciousness, but did note landing on her back. She also reported Right groin pain after the fall. Work up was negative with the exception of CT thoracic spine revealed subacute compression deformities along superior endplate of T7 and T8. Spine team recommended conservative management with Figure 8 brace and soft collar when out of bed.     She has a history of history of heart failure with preserved EF. Transthoracic echocardiogram on 11/25/24 was concerning for hyperdynamic Left ventricular systolic function with higher LVOT obstruction. Plan for outpatient follow up with Cardiology on 12/16/24 and will discuss possible cardiac MRI. " Lisbet was on PRN Furosemide 20 mg daily prior to admission.     Today she reports pain to be 8/10 before getting her pain medication. She does feel her pain is well controlled with the current regimen. Her wieght is up three lbs today from baseline weight of 202 lbs. We will give PRN lasix again.  She denies issues with bowel or bladder. Daily bowel movements. She is eating well. She reports melatonin to have helped with improving sleep. Mood has been stable. Bps stable 100-130s/60s. She denies shortness of breath, palpitations, dizziness or chest pain. Bps 140/70s.      CODE STATUS/ADVANCE DIRECTIVES DISCUSSION:  Full Code  CPR/Full code   ALLERGIES:   Allergies   Allergen Reactions    Cephalexin Anaphylaxis, Hives, Rash, Difficulty breathing, Shortness Of Breath and Unknown     Other Reaction(s): Not available, Swelling, lips/tongue, Unknown    Other reaction(s): Hives, Hives    Cephalosporins      Other Reaction(s): *Unknown    Vancomycin Other (See Comments)     neutropenia      PAST MEDICAL HISTORY: No past medical history on file.   PAST SURGICAL HISTORY:   has a past surgical history that includes IR Lumbar Puncture (10/31/2018).  FAMILY HISTORY: family history is not on file.  SOCIAL HISTORY:   reports that she has never smoked. She has never used smokeless tobacco. She reports current alcohol use. She reports that she does not use drugs.  Patient's living condition: lives with partner    Current Outpatient Medications   Medication Sig Dispense Refill    acetaminophen (TYLENOL) 325 MG tablet Take 650 mg by mouth every 6 hours as needed for mild pain.      ARIPiprazole (ABILIFY) 30 MG tablet Take 30 mg by mouth daily.      atorvastatin (LIPITOR) 10 MG tablet Take 10 mg by mouth daily.      busPIRone (BUSPAR) 10 MG tablet Take 20 mg by mouth 2 times daily.      Calcium Carb-Cholecalciferol (CALCIUM 500 + D) 500-5 MG-MCG TABS Take 1 tablet by mouth daily.      clonazePAM (KLONOPIN) 0.5 MG tablet Take 1 tablet  (0.5 mg) by mouth 3 times daily as needed for anxiety. 30 tablet 1    Cranberry 450 MG TABS Take 450 mg by mouth daily.      cyclobenzaprine (FLEXERIL) 10 MG tablet Take 10 mg by mouth daily as needed for muscle spasms.      cyclobenzaprine (FLEXERIL) 10 MG tablet Take 10 mg by mouth 2 times daily.      desvenlafaxine (PRISTIQ) 100 MG 24 hr tablet Take 100 mg by mouth daily.      furosemide (LASIX) 20 MG tablet Take 20 mg by mouth daily.      gabapentin (GRALISE) 600 MG 24 hr tablet Take 600 mg by mouth 4 times daily.      ketamine 5%-gabapentin 8% in PLO cream Apply topically 3 times daily as needed.      lactulose (CHRONULAC) 10 GM/15ML solution Take 10 g by mouth 4 times daily.      levothyroxine (SYNTHROID/LEVOTHROID) 175 MCG tablet Take 175 mcg by mouth daily.      metoprolol succinate ER (TOPROL XL) 25 MG 24 hr tablet Take 25 mg by mouth daily.      multivitamin (CENTRUM SILVER) tablet Take 1 tablet by mouth daily.      naloxone (NARCAN) 1 mg/mL SUSP Take 4 mg by mouth 3 times daily as needed for constipation.      ondansetron (ZOFRAN ODT) 4 MG ODT tab Take 4 mg by mouth every 8 hours as needed for nausea.      oxyBUTYnin (DITROPAN) 5 MG tablet Take 5 mg by mouth daily.      oxyCODONE (ROXICODONE) 5 MG tablet Take 15 mg by mouth every 4 hours as needed for severe pain.      polyethylene glycol (MIRALAX) 17 g packet Take 17 g by mouth daily.      pseudoePHEDrine (SUDAFED) 60 MG tablet Take 120 mg by mouth daily.      senna-docusate (SENOKOT-S/PERICOLACE) 8.6-50 MG tablet Take 1 tablet by mouth 2 times daily.      tiZANidine (ZANAFLEX) 2 MG tablet Take 4 mg by mouth every 6 hours as needed for muscle spasms.       No current facility-administered medications for this visit.      ROS:  Patient denies fever, chills, headache, lightheadedness, dizziness, rhinorrhea, cough, congestion, shortness of breath, chest pain, palpitations, abdominal pain, n/v, diarrhea, constipation, change in appetite, change in sleep  "pattern, dysuria, frequency, burning or pain with urination.  Other than stated in HPI all other review of systems is negative.      Vital signs:BP (!) 148/74   Pulse 74   Temp 97.8  F (36.6  C)   Resp 18   Ht 1.626 m (5' 4\")   Wt 93.1 kg (205 lb 4.8 oz)   SpO2 96%   BMI 35.24 kg/m     GENERAL APPEARANCE: Well developed, well nourished, in no acute distress. Obese  LUNGS: Lung sounds CTA, no adventitious sounds, respiratory effort normal.  CARD: RRR, S1, S2. Baseline Murmur  ABD: Soft, nondistended and nontender with normal bowel sounds.  MSK: Muscle strength and tone were equal bilaterally. Moves all extremities easily and intentionally.   EXTREMITIES: No cyanosis, clubbing or edema.  NEURO: Alert and oriented x 3. Normal affect. Sensation to touch was normal. Face is symmetric.  SKIN: Inspection of the skin reveals no rashes, ulcerations or petechiae.  PSYCH: euthymic     Lab/Diagnostic data:  Last Comprehensive Metabolic Panel:  Lab Results   Component Value Date     12/16/2024    POTASSIUM 4.5 12/16/2024    CHLORIDE 103 12/16/2024    CO2 28 12/16/2024    ANIONGAP 11 12/16/2024    GLC 98 12/16/2024    BUN 16.5 12/16/2024    CR 0.66 12/16/2024    GFRESTIMATED >90 12/16/2024    MARIELY 9.2 12/16/2024        Lab Results   Component Value Date    WBC 8.2 12/16/2024    WBC 5.7 03/08/2009     Lab Results   Component Value Date    RBC 3.34 12/16/2024    RBC 3.15 03/08/2009     Lab Results   Component Value Date    HGB 9.3 12/16/2024    HGB 9.3 03/08/2009     Lab Results   Component Value Date    HCT 30.4 12/16/2024    HCT 28.6 03/08/2009     Lab Results   Component Value Date    MCV 91 12/16/2024    MCV 91 03/08/2009     Lab Results   Component Value Date    MCH 27.8 12/16/2024    MCH 29.5 03/08/2009     Lab Results   Component Value Date    MCHC 30.6 12/16/2024    MCHC 32.5 03/08/2009     Lab Results   Component Value Date    RDW 14.5 12/16/2024    RDW 14.2 03/08/2009     Lab Results   Component Value Date    "  12/16/2024     03/11/2009      ASSESSMENT/PLAN:  Fall  Thoracic back pain  Right leg weakness, intermittent  Status post spinal fusion  Intermittent right lower extremity numbness/weakness since surgery in October  Prior MRIs have shown fluid collections felt likely seromas, including one in Right lumbar laminectomy bed, which were unchanged on repeat imaging  CT thoracic shows subacute compression deformities along superior endplate of T7 and T8  Pain well controlled  (per facility notes, 0-3/10) with tylenol, pain pump, oxy, cyclobenzaprine, triple cream, tizanidine   Continue Figure 8 brace and soft collar when out of bed  Follow up with pain clinic upon discharge from TCU  PT/OT    Heart failure with preserved ejection fraction  Other hypertrophic cardiomyopathy  Hypertension   New CHF with ECHO concerning for HCM and LVOT  Continue metoprolol succinate   Bps 100-130s/60s  PRN lasix 20 mg for increased weight, SOB, lower extremity edema  Monitor daily weights  Wt up 3 lbs today -- 202-->205 lbs (nursing to give PRN lasix)  outpatient cardiac MRI (has follow up scheduled with Cardiology on 12/16/24)    Chronic, continuous use of opioids  Status post insertion of intrathecal pump  Has pain pump in place with Fentanyl, Bupivacaine, and hydromorphone which is managed by [facility] in [place]  Continue PTA Cyclobenzaprine, Gabapentin, PRN Tizanidine, and PRN Oxycodone   Continue PTA PLO topical cream  - scheduled this today    Opioid induced constipation  Chronic- reports daily bowel movements  Continue Lactulose 4 times daily, PRN Miralax PRN Senna      Depression with anxiety  Mood stable today. Continue PTA Aripiprazole, Buspirone, Pristiq, and Clonazepam      Hypothyroidism  No changes today. TSH Nov 2024 0.62  Continue PTA levothyroxine     Mixed hyperlipidemia  No changes today. Continue PTA atorvastatin     Overactive bladder  No changes today. Continent of bladder.   Continue PTA oxybutynin       Obstructive sleep apnea  Stable. Continue CPAP - reports compliance     Migraines  Stable. No report of migraine while in TCU. PRN Relpax prior to admission. Resume upon discharge from TCU    Orders:  NNO    Electronically signed by:  MELODY Quinonez CNP on 12/17/2024 at 3:22 PM

## 2024-12-17 NOTE — LETTER
" 12/17/2024      Lisbet Sanchez  [address] [place] [id number]        [facility] GERIATRICS    Chief Complaint   Patient presents with     RECHECK     HPI:  Lisbet Sanchez is a 64 year old  (1960), who is being seen today for an episodic care visit at: [facility] ([facility]) [[id number]]. Today's concern is:  yovanny Sanchez  is a 64 year old  (1960), admitted to the above facility from  [facility]. Hospital stay 11/28/2024 through 12/12/2024..     HPI:    Lisbet Sanchez is a 64 y.o. with a history of chronic pain with opioid dependence and intrathecal pain pump, hypertension, depression with anxiety, hypothyroidism, obstructive sleep apnea, chronic back pain with revision of spinal fusion (T12-L1 by Dr. Moore 10/7/2024) who was admitted on 11/28/2024 for a fall and weakness. She had an extensive history including many hospitalizations between 10/28 and 11/26/24.     Per chart reivew, she presented to the ED following a fall on 11/28/24. She had been seen by home hospital program earlier in the day and reported feeling \"okay\", reportedly endorsed some weakness and fatigue. Did note desaturating when ambulating but dyspnea/orthopnea improved. Later in the evening, she was going to go to bed when her Right leg became numb and gave out. She fell backwards but denied hitting her head or lose consciousness, but did note landing on her back. She also reported Right groin pain after the fall. Work up was negative with the exception of CT thoracic spine revealed subacute compression deformities along superior endplate of T7 and T8. Spine team recommended conservative management with Figure 8 brace and soft collar when out of bed.     She has a history of history of heart failure with preserved EF. Transthoracic echocardiogram on 11/25/24 was concerning for hyperdynamic Left ventricular systolic function with higher LVOT obstruction. Plan for " outpatient follow up with Cardiology on 12/16/24 and will discuss possible cardiac MRI. Lisbet was on PRN Furosemide 20 mg daily prior to admission.     Today she reports pain to be 8/10 before getting her pain medication. She does feel her pain is well controlled with the current regimen. Her wieght is up three lbs today from baseline weight of 202 lbs. We will give PRN lasix again.  She denies issues with bowel or bladder. Daily bowel movements. She is eating well. She reports melatonin to have helped with improving sleep. Mood has been stable. Bps stable 100-130s/60s. She denies shortness of breath, palpitations, dizziness or chest pain. Bps 140/70s.      CODE STATUS/ADVANCE DIRECTIVES DISCUSSION:  Full Code  CPR/Full code   ALLERGIES:   Allergies   Allergen Reactions     Cephalexin Anaphylaxis, Hives, Rash, Difficulty breathing, Shortness Of Breath and Unknown     Other Reaction(s): Not available, Swelling, lips/tongue, Unknown    Other reaction(s): Hives, Hives     Cephalosporins      Other Reaction(s): *Unknown     Vancomycin Other (See Comments)     neutropenia      PAST MEDICAL HISTORY: No past medical history on file.   PAST SURGICAL HISTORY:   has a past surgical history that includes IR Lumbar Puncture (10/31/2018).  FAMILY HISTORY: family history is not on file.  SOCIAL HISTORY:   reports that she has never smoked. She has never used smokeless tobacco. She reports current alcohol use. She reports that she does not use drugs.  Patient's living condition: lives with partner    Current Outpatient Medications   Medication Sig Dispense Refill     acetaminophen (TYLENOL) 325 MG tablet Take 650 mg by mouth every 6 hours as needed for mild pain.       ARIPiprazole (ABILIFY) 30 MG tablet Take 30 mg by mouth daily.       atorvastatin (LIPITOR) 10 MG tablet Take 10 mg by mouth daily.       busPIRone (BUSPAR) 10 MG tablet Take 20 mg by mouth 2 times daily.       Calcium Carb-Cholecalciferol (CALCIUM 500 + D) 500-5  MG-MCG TABS Take 1 tablet by mouth daily.       clonazePAM (KLONOPIN) 0.5 MG tablet Take 1 tablet (0.5 mg) by mouth 3 times daily as needed for anxiety. 30 tablet 1     Cranberry 450 MG TABS Take 450 mg by mouth daily.       cyclobenzaprine (FLEXERIL) 10 MG tablet Take 10 mg by mouth daily as needed for muscle spasms.       cyclobenzaprine (FLEXERIL) 10 MG tablet Take 10 mg by mouth 2 times daily.       desvenlafaxine (PRISTIQ) 100 MG 24 hr tablet Take 100 mg by mouth daily.       furosemide (LASIX) 20 MG tablet Take 20 mg by mouth daily.       gabapentin (GRALISE) 600 MG 24 hr tablet Take 600 mg by mouth 4 times daily.       ketamine 5%-gabapentin 8% in PLO cream Apply topically 3 times daily as needed.       lactulose (CHRONULAC) 10 GM/15ML solution Take 10 g by mouth 4 times daily.       levothyroxine (SYNTHROID/LEVOTHROID) 175 MCG tablet Take 175 mcg by mouth daily.       metoprolol succinate ER (TOPROL XL) 25 MG 24 hr tablet Take 25 mg by mouth daily.       multivitamin (CENTRUM SILVER) tablet Take 1 tablet by mouth daily.       naloxone (NARCAN) 1 mg/mL SUSP Take 4 mg by mouth 3 times daily as needed for constipation.       ondansetron (ZOFRAN ODT) 4 MG ODT tab Take 4 mg by mouth every 8 hours as needed for nausea.       oxyBUTYnin (DITROPAN) 5 MG tablet Take 5 mg by mouth daily.       oxyCODONE (ROXICODONE) 5 MG tablet Take 15 mg by mouth every 4 hours as needed for severe pain.       polyethylene glycol (MIRALAX) 17 g packet Take 17 g by mouth daily.       pseudoePHEDrine (SUDAFED) 60 MG tablet Take 120 mg by mouth daily.       senna-docusate (SENOKOT-S/PERICOLACE) 8.6-50 MG tablet Take 1 tablet by mouth 2 times daily.       tiZANidine (ZANAFLEX) 2 MG tablet Take 4 mg by mouth every 6 hours as needed for muscle spasms.       No current facility-administered medications for this visit.      ROS:  Patient denies fever, chills, headache, lightheadedness, dizziness, rhinorrhea, cough, congestion, shortness of  "breath, chest pain, palpitations, abdominal pain, n/v, diarrhea, constipation, change in appetite, change in sleep pattern, dysuria, frequency, burning or pain with urination.  Other than stated in HPI all other review of systems is negative.      Vital signs:BP (!) 148/74   Pulse 74   Temp 97.8  F (36.6  C)   Resp 18   Ht 1.626 m (5' 4\")   Wt 93.1 kg (205 lb 4.8 oz)   SpO2 96%   BMI 35.24 kg/m     GENERAL APPEARANCE: Well developed, well nourished, in no acute distress. Obese  LUNGS: Lung sounds CTA, no adventitious sounds, respiratory effort normal.  CARD: RRR, S1, S2. Baseline Murmur  ABD: Soft, nondistended and nontender with normal bowel sounds.  MSK: Muscle strength and tone were equal bilaterally. Moves all extremities easily and intentionally.   EXTREMITIES: No cyanosis, clubbing or edema.  NEURO: Alert and oriented x 3. Normal affect. Sensation to touch was normal. Face is symmetric.  SKIN: Inspection of the skin reveals no rashes, ulcerations or petechiae.  PSYCH: euthymic     Lab/Diagnostic data:  Last Comprehensive Metabolic Panel:  Lab Results   Component Value Date     12/16/2024    POTASSIUM 4.5 12/16/2024    CHLORIDE 103 12/16/2024    CO2 28 12/16/2024    ANIONGAP 11 12/16/2024    GLC 98 12/16/2024    BUN 16.5 12/16/2024    CR 0.66 12/16/2024    GFRESTIMATED >90 12/16/2024    MARIELY 9.2 12/16/2024        Lab Results   Component Value Date    WBC 8.2 12/16/2024    WBC 5.7 03/08/2009     Lab Results   Component Value Date    RBC 3.34 12/16/2024    RBC 3.15 03/08/2009     Lab Results   Component Value Date    HGB 9.3 12/16/2024    HGB 9.3 03/08/2009     Lab Results   Component Value Date    HCT 30.4 12/16/2024    HCT 28.6 03/08/2009     Lab Results   Component Value Date    MCV 91 12/16/2024    MCV 91 03/08/2009     Lab Results   Component Value Date    MCH 27.8 12/16/2024    MCH 29.5 03/08/2009     Lab Results   Component Value Date    MCHC 30.6 12/16/2024    [facility] 32.5 03/08/2009     Lab " Results   Component Value Date    RDW 14.5 12/16/2024    RDW 14.2 03/08/2009     Lab Results   Component Value Date     12/16/2024     03/11/2009      ASSESSMENT/PLAN:  Fall  Thoracic back pain  Right leg weakness, intermittent  Status post spinal fusion  Intermittent right lower extremity numbness/weakness since surgery in October  Prior MRIs have shown fluid collections felt likely seromas, including one in Right lumbar laminectomy bed, which were unchanged on repeat imaging  CT thoracic shows subacute compression deformities along superior endplate of T7 and T8  Pain well controlled  (per facility notes, 0-3/10) with tylenol, pain pump, oxy, cyclobenzaprine, triple cream, tizanidine   Continue Figure 8 brace and soft collar when out of bed  Follow up with pain clinic upon discharge from TCU  PT/OT    Heart failure with preserved ejection fraction  Other hypertrophic cardiomyopathy  Hypertension   New CHF with ECHO concerning for HCM and LVOT  Continue metoprolol succinate   Bps 100-130s/60s  PRN lasix 20 mg for increased weight, SOB, lower extremity edema  Monitor daily weights  Wt up 3 lbs today -- 202-->205 lbs (nursing to give PRN lasix)  outpatient cardiac MRI (has follow up scheduled with Cardiology on 12/16/24)    Chronic, continuous use of opioids  Status post insertion of intrathecal pump  Has pain pump in place with Fentanyl, Bupivacaine, and hydromorphone which is managed by [facility] in [place]  Continue PTA Cyclobenzaprine, Gabapentin, PRN Tizanidine, and PRN Oxycodone   Continue PTA PLO topical cream  - scheduled this today    Opioid induced constipation  Chronic- reports daily bowel movements  Continue Lactulose 4 times daily, PRN Miralax PRN Senna      Depression with anxiety  Mood stable today. Continue PTA Aripiprazole, Buspirone, Pristiq, and Clonazepam      Hypothyroidism  No changes today. TSH Nov 2024 0.62  Continue PTA levothyroxine     Mixed hyperlipidemia  No changes  today. Continue PTA atorvastatin     Overactive bladder  No changes today. Continent of bladder.   Continue PTA oxybutynin      Obstructive sleep apnea  Stable. Continue CPAP - reports compliance     Migraines  Stable. No report of migraine while in TCU. PRN Relpax prior to admission. Resume upon discharge from TCU    Orders:  NNO    Electronically signed by:  MELODY Quinonez CNP on 12/17/2024 at 3:22 PM             Sincerely,        MELODY Quinonez CNP

## 2024-12-19 ENCOUNTER — TRANSITIONAL CARE UNIT VISIT (OUTPATIENT)
Dept: GERIATRICS | Facility: CLINIC | Age: 64
End: 2024-12-19
Payer: COMMERCIAL

## 2024-12-19 ENCOUNTER — LAB REQUISITION (OUTPATIENT)
Dept: LAB | Facility: CLINIC | Age: 64
End: 2024-12-19
Payer: COMMERCIAL

## 2024-12-19 VITALS
BODY MASS INDEX: 35.3 KG/M2 | RESPIRATION RATE: 16 BRPM | SYSTOLIC BLOOD PRESSURE: 108 MMHG | OXYGEN SATURATION: 94 % | WEIGHT: 206.8 LBS | HEART RATE: 73 BPM | TEMPERATURE: 97.8 F | HEIGHT: 64 IN | DIASTOLIC BLOOD PRESSURE: 53 MMHG

## 2024-12-19 DIAGNOSIS — G89.29 CHRONIC BILATERAL THORACIC BACK PAIN: ICD-10-CM

## 2024-12-19 DIAGNOSIS — G43.909 MIGRAINE WITHOUT STATUS MIGRAINOSUS, NOT INTRACTABLE, UNSPECIFIED MIGRAINE TYPE: ICD-10-CM

## 2024-12-19 DIAGNOSIS — K59.03 OPIOID-INDUCED CONSTIPATION: ICD-10-CM

## 2024-12-19 DIAGNOSIS — M54.6 CHRONIC BILATERAL THORACIC BACK PAIN: ICD-10-CM

## 2024-12-19 DIAGNOSIS — I11.0 HYPERTENSIVE HEART DISEASE WITH HEART FAILURE (H): ICD-10-CM

## 2024-12-19 DIAGNOSIS — T40.2X5A OPIOID-INDUCED CONSTIPATION: ICD-10-CM

## 2024-12-19 DIAGNOSIS — G47.33 OSA (OBSTRUCTIVE SLEEP APNEA): ICD-10-CM

## 2024-12-19 DIAGNOSIS — I10 HYPERTENSION, UNSPECIFIED TYPE: ICD-10-CM

## 2024-12-19 DIAGNOSIS — I50.30 HEART FAILURE WITH PRESERVED EJECTION FRACTION, NYHA CLASS I (H): ICD-10-CM

## 2024-12-19 DIAGNOSIS — F11.90 CHRONIC, CONTINUOUS USE OF OPIOIDS: ICD-10-CM

## 2024-12-19 DIAGNOSIS — Z98.890 S/P INSERTION OF INTRATHECAL PUMP: ICD-10-CM

## 2024-12-19 DIAGNOSIS — I42.2 HYPERTROPHIC CARDIOMYOPATHY (H): ICD-10-CM

## 2024-12-19 DIAGNOSIS — R29.898 RIGHT LEG WEAKNESS: ICD-10-CM

## 2024-12-19 DIAGNOSIS — E78.2 MIXED HYPERLIPIDEMIA: ICD-10-CM

## 2024-12-19 DIAGNOSIS — F41.8 DEPRESSION WITH ANXIETY: ICD-10-CM

## 2024-12-19 DIAGNOSIS — Z98.1 S/P SPINAL FUSION: ICD-10-CM
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
## 2024-12-19 DIAGNOSIS — N32.81 OVERACTIVE BLADDER: ICD-10-CM

## 2024-12-19 DIAGNOSIS — E03.9 ACQUIRED HYPOTHYROIDISM: ICD-10-CM

## 2024-12-19 RX ORDER — FUROSEMIDE 20 MG/1
20 TABLET ORAL DAILY
COMMUNITY

## 2024-12-19 NOTE — PROGRESS NOTES
"[facility] GERIATRICS    Chief Complaint   Patient presents with    RECHECK     HPI:  Lisbet Sanchez is a 64 year old  (1960), who is being seen today for an episodic care visit at: [facility] ([facility]) [[id number]]. Today's concern is:  yovanny Sanchez  is a 64 year old  (1960), admitted to the above facility from  [facility]. Hospital stay 11/28/2024 through 12/12/2024..     HPI:    Lisbet Sanchez is a 64 y.o. with a history of chronic pain with opioid dependence and intrathecal pain pump, hypertension, depression with anxiety, hypothyroidism, obstructive sleep apnea, chronic back pain with revision of spinal fusion (T12-L1 by Dr. Moore 10/7/2024) who was admitted on 11/28/2024 for a fall and weakness. She had an extensive history including many hospitalizations between 10/28 and 11/26/24.     Per chart reivew, she presented to the ED following a fall on 11/28/24. She had been seen by home hospital program earlier in the day and reported feeling \"okay\", reportedly endorsed some weakness and fatigue. Did note desaturating when ambulating but dyspnea/orthopnea improved. Later in the evening, she was going to go to bed when her Right leg became numb and gave out. She fell backwards but denied hitting her head or lose consciousness, but did note landing on her back. She also reported Right groin pain after the fall. Work up was negative with the exception of CT thoracic spine revealed subacute compression deformities along superior endplate of T7 and T8. Spine team recommended conservative management with Figure 8 brace and soft collar when out of bed.     She has a history of history of heart failure with preserved EF. Transthoracic echocardiogram on 11/25/24 was concerning for hyperdynamic Left ventricular systolic function with higher LVOT obstruction. Plan for outpatient follow up with Cardiology on 12/16/24 and will discuss possible cardiac MRI. " Lisbet was on PRN Furosemide 20 mg daily prior to admission.     Today she reports pain to be 8/10 before getting her pain medication. She does feel that once she receives her pain regimen she has adequate pain control. She continues to have an elevated weight. Today is 206 lbs (baseline wt in TCU is 202 lbs). She endorses orthopnea as well as abdominal distention. Discussed starting lasix daily and she is in agreement with this plan. She is also experiencing constipation. She would like to try scheduled miralax daily. No concerns with urination. Bps have been well managed. Mood is stable. Denies shortness of breath, dizziness, lightheadedness, palpitations. Unfortunately, her insurance has issued a last covered day. I encourage her to appeal this as we are starting new daily lasix and will need to monitor her kidney function/hydration status closely.      CODE STATUS/ADVANCE DIRECTIVES DISCUSSION:  Full Code  CPR/Full code   ALLERGIES:   Allergies   Allergen Reactions    Cephalexin Anaphylaxis, Hives, Rash, Difficulty breathing, Shortness Of Breath and Unknown     Other Reaction(s): Not available, Swelling, lips/tongue, Unknown    Other reaction(s): Hives, Hives    Cephalosporins      Other Reaction(s): *Unknown    Vancomycin Other (See Comments)     neutropenia    Other Reaction(s): Neutropenia      PAST MEDICAL HISTORY: No past medical history on file.   PAST SURGICAL HISTORY:   has a past surgical history that includes IR Lumbar Puncture (10/31/2018).  FAMILY HISTORY: family history is not on file.  SOCIAL HISTORY:   reports that she has never smoked. She has never used smokeless tobacco. She reports current alcohol use. She reports that she does not use drugs.  Patient's living condition: lives with partner    Current Outpatient Medications   Medication Sig Dispense Refill    furosemide (LASIX) 20 MG tablet Take 20 mg by mouth daily.      acetaminophen (TYLENOL) 325 MG tablet Take 650 mg by mouth every 6 hours  as needed for mild pain.      ARIPiprazole (ABILIFY) 30 MG tablet Take 30 mg by mouth daily.      atorvastatin (LIPITOR) 10 MG tablet Take 10 mg by mouth daily.      busPIRone (BUSPAR) 10 MG tablet Take 20 mg by mouth 2 times daily.      Calcium Carb-Cholecalciferol (CALCIUM 500 + D) 500-5 MG-MCG TABS Take 1 tablet by mouth daily.      clonazePAM (KLONOPIN) 0.5 MG tablet Take 1 tablet (0.5 mg) by mouth 3 times daily as needed for anxiety. 30 tablet 1    Cranberry 450 MG TABS Take 450 mg by mouth daily.      cyclobenzaprine (FLEXERIL) 10 MG tablet Take 10 mg by mouth daily as needed for muscle spasms.      cyclobenzaprine (FLEXERIL) 10 MG tablet Take 10 mg by mouth 2 times daily.      desvenlafaxine (PRISTIQ) 100 MG 24 hr tablet Take 100 mg by mouth daily.      gabapentin (GRALISE) 600 MG 24 hr tablet Take 600 mg by mouth 4 times daily.      ketamine 5%-gabapentin 8% in PLO cream Apply topically 3 times daily as needed.      lactulose (CHRONULAC) 10 GM/15ML solution Take 10 g by mouth 4 times daily.      levothyroxine (SYNTHROID/LEVOTHROID) 175 MCG tablet Take 175 mcg by mouth daily.      metoprolol succinate ER (TOPROL XL) 25 MG 24 hr tablet Take 25 mg by mouth daily.      multivitamin (CENTRUM SILVER) tablet Take 1 tablet by mouth daily.      naloxone (NARCAN) 1 mg/mL SUSP Take 4 mg by mouth 3 times daily as needed for constipation.      ondansetron (ZOFRAN ODT) 4 MG ODT tab Take 4 mg by mouth every 8 hours as needed for nausea.      oxyBUTYnin (DITROPAN) 5 MG tablet Take 5 mg by mouth daily.      oxyCODONE (ROXICODONE) 5 MG tablet Take 15 mg by mouth every 4 hours as needed for severe pain.      polyethylene glycol (MIRALAX) 17 g packet Take 17 g by mouth daily.      pseudoePHEDrine (SUDAFED) 60 MG tablet Take 120 mg by mouth daily.      senna-docusate (SENOKOT-S/PERICOLACE) 8.6-50 MG tablet Take 1 tablet by mouth 2 times daily.      tiZANidine (ZANAFLEX) 2 MG tablet Take 4 mg by mouth every 6 hours as needed for  "muscle spasms.       No current facility-administered medications for this visit.      ROS:  Patient denies fever, chills, headache, lightheadedness, dizziness, rhinorrhea, cough, congestion, shortness of breath, chest pain, palpitations, abdominal pain, n/v, diarrhea, constipation, change in appetite, change in sleep pattern, dysuria, frequency, burning or pain with urination.  Other than stated in HPI all other review of systems is negative.      Vital signs:/53   Pulse 73   Temp 97.8  F (36.6  C)   Resp 16   Ht 1.626 m (5' 4\")   Wt 93.8 kg (206 lb 12.8 oz)   SpO2 94%   BMI 35.50 kg/m     GENERAL APPEARANCE: Well developed, well nourished, in no acute distress. Obese  LUNGS: Lung sounds CTA, no adventitious sounds, respiratory effort normal.  CARD: RRR, murmur  BACK: Brace in place  ABD: Soft, nondistended and nontender with normal bowel sounds.   MSK: Muscle strength and tone were equal bilaterally. Moves all extremities easily and intentionally.   EXTREMITIES: No cyanosis, clubbing or edema.  NEURO: Alert and oriented x 3. Normal affect. Sensation to touch was normal. Face is symmetric.  SKIN: Inspection of the skin reveals no rashes, ulcerations or petechiae.  PSYCH: euthymic     Lab/Diagnostic data:  Last Comprehensive Metabolic Panel:  Lab Results   Component Value Date     12/16/2024    POTASSIUM 4.5 12/16/2024    CHLORIDE 103 12/16/2024    CO2 28 12/16/2024    ANIONGAP 11 12/16/2024    GLC 98 12/16/2024    BUN 16.5 12/16/2024    CR 0.66 12/16/2024    GFRESTIMATED >90 12/16/2024    MARIELY 9.2 12/16/2024        Lab Results   Component Value Date    WBC 8.2 12/16/2024    WBC 5.7 03/08/2009     Lab Results   Component Value Date    RBC 3.34 12/16/2024    RBC 3.15 03/08/2009     Lab Results   Component Value Date    HGB 9.3 12/16/2024    HGB 9.3 03/08/2009     Lab Results   Component Value Date    HCT 30.4 12/16/2024    HCT 28.6 03/08/2009     Lab Results   Component Value Date    MCV 91 " 12/16/2024    MCV 91 03/08/2009     Lab Results   Component Value Date    MCH 27.8 12/16/2024    MCH 29.5 03/08/2009     Lab Results   Component Value Date    MCHC 30.6 12/16/2024    MCHC 32.5 03/08/2009     Lab Results   Component Value Date    RDW 14.5 12/16/2024    RDW 14.2 03/08/2009     Lab Results   Component Value Date     12/16/2024     03/11/2009      ASSESSMENT/PLAN:  Fall  Thoracic back pain  Right leg weakness, intermittent  Status post spinal fusion  Intermittent right lower extremity numbness/weakness since surgery in October  Prior MRIs have shown fluid collections felt likely seromas, including one in Right lumbar laminectomy bed, which were unchanged on repeat imaging  CT thoracic shows subacute compression deformities along superior endplate of T7 and T8  Pain well controlled  (per facility notes, 0-3/10) with tylenol, pain pump, oxy, cyclobenzaprine, triple cream, tizanidine   Continue Figure 8 brace and soft collar when out of bed  Follow up with pain clinic upon discharge from TCU  PT/OT    Heart failure with preserved ejection fraction  Other hypertrophic cardiomyopathy  Hypertension   New CHF with ECHO concerning for HCM and LVOT  Continue metoprolol succinate - HOLD for SBP <90  HR 70-80s  Bps 100-130s/60s  Wt elevated 206 lbs  Given persistent elevated weight despite PRN lasix, will start daily lasix today 20 mg  BMP due 12/23/24 and again 12/27/24- if pt is discharged due to cut off from insurance, need to ensure follow up with PCP for monitoring of kidney function/hydration status is arranged within 1-5 days of discharge.   Monitor daily weights  outpatient cardiac MRI (has follow up scheduled with Cardiology on 12/16/24-- appears she may have missed this appt?)    Chronic, continuous use of opioids  Status post insertion of intrathecal pump  Stable no changes  Has pain pump in place with Fentanyl, Bupivacaine, and hydromorphone which is managed by [facility] in  [place]  Continue PTA Cyclobenzaprine, Gabapentin, PRN Tizanidine, and PRN Oxycodone   Continue PTA PLO topical cream    Opioid induced constipation  Chronic  Continue Lactulose 4 times daily and senna  Schedule miralax today     Depression with anxiety  No changes today. Continue PTA Aripiprazole, Buspirone, Pristiq, and Clonazepam      Hypothyroidism  Stable. TSH Nov 2024 0.62  Continue PTA levothyroxine  Recheck TSH in 6-12 months     Mixed hyperlipidemia  No changes today. Continue PTA atorvastatin     Overactive bladder  Stable. Continent of bladder.   Continue PTA oxybutynin      Obstructive sleep apnea  No changes today. Continue CPAP - reports compliance     Migraines  No changes today. No report of migraine while in TCU. PRN Relpax prior to admission. Resume upon discharge from TCU    Orders:  Miralax daily  will start daily lasix today 20 mg  BMP due 12/23/24 and again 12/27/24- if pt is discharged due to cut off from insurance, need to ensure follow up with PCP for monitoring of kidney function/hydration status is arranged within 1-5 days of discharge.     Electronically signed by:  MELODY Quinonez CNP on 12/19/2024 at 9:41 AM

## 2024-12-19 NOTE — LETTER
" 12/19/2024      Lisbet Sanchez  [address] [place] [id number]        [facility] GERIATRICS    Chief Complaint   Patient presents with     RECHECK     HPI:  Lisbet Sanchez is a 64 year old  (1960), who is being seen today for an episodic care visit at: [facility] ([facility]) [[id number]]. Today's concern is:  yovanny Sanchez  is a 64 year old  (1960), admitted to the above facility from  [facility]. Hospital stay 11/28/2024 through 12/12/2024..     HPI:    Lisbet Sanchez is a 64 y.o. with a history of chronic pain with opioid dependence and intrathecal pain pump, hypertension, depression with anxiety, hypothyroidism, obstructive sleep apnea, chronic back pain with revision of spinal fusion (T12-L1 by Dr. Moore 10/7/2024) who was admitted on 11/28/2024 for a fall and weakness. She had an extensive history including many hospitalizations between 10/28 and 11/26/24.     Per chart reivew, she presented to the ED following a fall on 11/28/24. She had been seen by home hospital program earlier in the day and reported feeling \"okay\", reportedly endorsed some weakness and fatigue. Did note desaturating when ambulating but dyspnea/orthopnea improved. Later in the evening, she was going to go to bed when her Right leg became numb and gave out. She fell backwards but denied hitting her head or lose consciousness, but did note landing on her back. She also reported Right groin pain after the fall. Work up was negative with the exception of CT thoracic spine revealed subacute compression deformities along superior endplate of T7 and T8. Spine team recommended conservative management with Figure 8 brace and soft collar when out of bed.     She has a history of history of heart failure with preserved EF. Transthoracic echocardiogram on 11/25/24 was concerning for hyperdynamic Left ventricular systolic function with higher LVOT obstruction. Plan for " outpatient follow up with Cardiology on 12/16/24 and will discuss possible cardiac MRI. Lisbet was on PRN Furosemide 20 mg daily prior to admission.     Today she reports pain to be 8/10 before getting her pain medication. She does feel that once she receives her pain regimen she has adequate pain control. She continues to have an elevated weight. Today is 206 lbs (baseline wt in TCU is 202 lbs). She endorses orthopnea as well as abdominal distention. Discussed starting lasix daily and she is in agreement with this plan. She is also experiencing constipation. She would like to try scheduled miralax daily. No concerns with urination. Bps have been well managed. Mood is stable. Denies shortness of breath, dizziness, lightheadedness, palpitations. Unfortunately, her insurance has issued a last covered day. I encourage her to appeal this as we are starting new daily lasix and will need to monitor her kidney function/hydration status closely.      CODE STATUS/ADVANCE DIRECTIVES DISCUSSION:  Full Code  CPR/Full code   ALLERGIES:   Allergies   Allergen Reactions     Cephalexin Anaphylaxis, Hives, Rash, Difficulty breathing, Shortness Of Breath and Unknown     Other Reaction(s): Not available, Swelling, lips/tongue, Unknown    Other reaction(s): Hives, Hives     Cephalosporins      Other Reaction(s): *Unknown     Vancomycin Other (See Comments)     neutropenia    Other Reaction(s): Neutropenia      PAST MEDICAL HISTORY: No past medical history on file.   PAST SURGICAL HISTORY:   has a past surgical history that includes IR Lumbar Puncture (10/31/2018).  FAMILY HISTORY: family history is not on file.  SOCIAL HISTORY:   reports that she has never smoked. She has never used smokeless tobacco. She reports current alcohol use. She reports that she does not use drugs.  Patient's living condition: lives with partner    Current Outpatient Medications   Medication Sig Dispense Refill     furosemide (LASIX) 20 MG tablet Take 20 mg  by mouth daily.       acetaminophen (TYLENOL) 325 MG tablet Take 650 mg by mouth every 6 hours as needed for mild pain.       ARIPiprazole (ABILIFY) 30 MG tablet Take 30 mg by mouth daily.       atorvastatin (LIPITOR) 10 MG tablet Take 10 mg by mouth daily.       busPIRone (BUSPAR) 10 MG tablet Take 20 mg by mouth 2 times daily.       Calcium Carb-Cholecalciferol (CALCIUM 500 + D) 500-5 MG-MCG TABS Take 1 tablet by mouth daily.       clonazePAM (KLONOPIN) 0.5 MG tablet Take 1 tablet (0.5 mg) by mouth 3 times daily as needed for anxiety. 30 tablet 1     Cranberry 450 MG TABS Take 450 mg by mouth daily.       cyclobenzaprine (FLEXERIL) 10 MG tablet Take 10 mg by mouth daily as needed for muscle spasms.       cyclobenzaprine (FLEXERIL) 10 MG tablet Take 10 mg by mouth 2 times daily.       desvenlafaxine (PRISTIQ) 100 MG 24 hr tablet Take 100 mg by mouth daily.       gabapentin (GRALISE) 600 MG 24 hr tablet Take 600 mg by mouth 4 times daily.       ketamine 5%-gabapentin 8% in PLO cream Apply topically 3 times daily as needed.       lactulose (CHRONULAC) 10 GM/15ML solution Take 10 g by mouth 4 times daily.       levothyroxine (SYNTHROID/LEVOTHROID) 175 MCG tablet Take 175 mcg by mouth daily.       metoprolol succinate ER (TOPROL XL) 25 MG 24 hr tablet Take 25 mg by mouth daily.       multivitamin (CENTRUM SILVER) tablet Take 1 tablet by mouth daily.       naloxone (NARCAN) 1 mg/mL SUSP Take 4 mg by mouth 3 times daily as needed for constipation.       ondansetron (ZOFRAN ODT) 4 MG ODT tab Take 4 mg by mouth every 8 hours as needed for nausea.       oxyBUTYnin (DITROPAN) 5 MG tablet Take 5 mg by mouth daily.       oxyCODONE (ROXICODONE) 5 MG tablet Take 15 mg by mouth every 4 hours as needed for severe pain.       polyethylene glycol (MIRALAX) 17 g packet Take 17 g by mouth daily.       pseudoePHEDrine (SUDAFED) 60 MG tablet Take 120 mg by mouth daily.       senna-docusate (SENOKOT-S/PERICOLACE) 8.6-50 MG tablet Take 1  "tablet by mouth 2 times daily.       tiZANidine (ZANAFLEX) 2 MG tablet Take 4 mg by mouth every 6 hours as needed for muscle spasms.       No current facility-administered medications for this visit.      ROS:  Patient denies fever, chills, headache, lightheadedness, dizziness, rhinorrhea, cough, congestion, shortness of breath, chest pain, palpitations, abdominal pain, n/v, diarrhea, constipation, change in appetite, change in sleep pattern, dysuria, frequency, burning or pain with urination.  Other than stated in HPI all other review of systems is negative.      Vital signs:/53   Pulse 73   Temp 97.8  F (36.6  C)   Resp 16   Ht 1.626 m (5' 4\")   Wt 93.8 kg (206 lb 12.8 oz)   SpO2 94%   BMI 35.50 kg/m     GENERAL APPEARANCE: Well developed, well nourished, in no acute distress. Obese  LUNGS: Lung sounds CTA, no adventitious sounds, respiratory effort normal.  CARD: RRR, murmur  BACK: Brace in place  ABD: Soft, nondistended and nontender with normal bowel sounds.   MSK: Muscle strength and tone were equal bilaterally. Moves all extremities easily and intentionally.   EXTREMITIES: No cyanosis, clubbing or edema.  NEURO: Alert and oriented x 3. Normal affect. Sensation to touch was normal. Face is symmetric.  SKIN: Inspection of the skin reveals no rashes, ulcerations or petechiae.  PSYCH: euthymic     Lab/Diagnostic data:  Last Comprehensive Metabolic Panel:  Lab Results   Component Value Date     12/16/2024    POTASSIUM 4.5 12/16/2024    CHLORIDE 103 12/16/2024    CO2 28 12/16/2024    ANIONGAP 11 12/16/2024    GLC 98 12/16/2024    BUN 16.5 12/16/2024    CR 0.66 12/16/2024    GFRESTIMATED >90 12/16/2024    MARIELY 9.2 12/16/2024        Lab Results   Component Value Date    WBC 8.2 12/16/2024    WBC 5.7 03/08/2009     Lab Results   Component Value Date    RBC 3.34 12/16/2024    RBC 3.15 03/08/2009     Lab Results   Component Value Date    HGB 9.3 12/16/2024    HGB 9.3 03/08/2009     Lab Results "   Component Value Date    HCT 30.4 12/16/2024    HCT 28.6 03/08/2009     Lab Results   Component Value Date    MCV 91 12/16/2024    MCV 91 03/08/2009     Lab Results   Component Value Date    MCH 27.8 12/16/2024    MCH 29.5 03/08/2009     Lab Results   Component Value Date    MCHC 30.6 12/16/2024    MCHC 32.5 03/08/2009     Lab Results   Component Value Date    RDW 14.5 12/16/2024    RDW 14.2 03/08/2009     Lab Results   Component Value Date     12/16/2024     03/11/2009      ASSESSMENT/PLAN:  Fall  Thoracic back pain  Right leg weakness, intermittent  Status post spinal fusion  Intermittent right lower extremity numbness/weakness since surgery in October  Prior MRIs have shown fluid collections felt likely seromas, including one in Right lumbar laminectomy bed, which were unchanged on repeat imaging  CT thoracic shows subacute compression deformities along superior endplate of T7 and T8  Pain well controlled  (per facility notes, 0-3/10) with tylenol, pain pump, oxy, cyclobenzaprine, triple cream, tizanidine   Continue Figure 8 brace and soft collar when out of bed  Follow up with pain clinic upon discharge from TCU  PT/OT    Heart failure with preserved ejection fraction  Other hypertrophic cardiomyopathy  Hypertension   New CHF with ECHO concerning for HCM and LVOT  Continue metoprolol succinate - HOLD for SBP <90  HR 70-80s  Bps 100-130s/60s  Wt elevated 206 lbs  Given persistent elevated weight despite PRN lasix, will start daily lasix today 20 mg  BMP due 12/23/24 and again 12/27/24- if pt is discharged due to cut off from insurance, need to ensure follow up with PCP for monitoring of kidney function/hydration status is arranged within 1-5 days of discharge.   Monitor daily weights  outpatient cardiac MRI (has follow up scheduled with Cardiology on 12/16/24-- appears she may have missed this appt?)    Chronic, continuous use of opioids  Status post insertion of intrathecal pump  Stable no  changes  Has pain pump in place with Fentanyl, Bupivacaine, and hydromorphone which is managed by [facility] in [place]  Continue PTA Cyclobenzaprine, Gabapentin, PRN Tizanidine, and PRN Oxycodone   Continue PTA PLO topical cream    Opioid induced constipation  Chronic  Continue Lactulose 4 times daily and senna  Schedule miralax today     Depression with anxiety  No changes today. Continue PTA Aripiprazole, Buspirone, Pristiq, and Clonazepam      Hypothyroidism  Stable. TSH Nov 2024 0.62  Continue PTA levothyroxine  Recheck TSH in 6-12 months     Mixed hyperlipidemia  No changes today. Continue PTA atorvastatin     Overactive bladder  Stable. Continent of bladder.   Continue PTA oxybutynin      Obstructive sleep apnea  No changes today. Continue CPAP - reports compliance     Migraines  No changes today. No report of migraine while in TCU. PRN Relpax prior to admission. Resume upon discharge from TCU    Orders:  Miralax daily  will start daily lasix today 20 mg  BMP due 12/23/24 and again 12/27/24- if pt is discharged due to cut off from insurance, need to ensure follow up with PCP for monitoring of kidney function/hydration status is arranged within 1-5 days of discharge.     Electronically signed by:  MELODY Quinonez CNP on 12/19/2024 at 9:41 AM             Sincerely,        MELODY Quinonez CNP

## 2025-02-13 ENCOUNTER — LAB REQUISITION (OUTPATIENT)
Dept: LAB | Facility: CLINIC | Age: 65
End: 2025-02-13
Payer: COMMERCIAL

## 2025-02-13 ENCOUNTER — DOCUMENTATION ONLY (OUTPATIENT)
Dept: GERIATRICS | Facility: CLINIC | Age: 65
End: 2025-02-13
Payer: COMMERCIAL

## 2025-02-13 DIAGNOSIS — Z11.1 ENCOUNTER FOR SCREENING FOR RESPIRATORY TUBERCULOSIS: ICD-10-CM

## 2025-02-14 PROCEDURE — 86481 TB AG RESPONSE T-CELL SUSP: CPT | Performed by: NURSE PRACTITIONER

## 2025-02-14 PROCEDURE — 36415 COLL VENOUS BLD VENIPUNCTURE: CPT | Performed by: NURSE PRACTITIONER

## 2025-02-14 PROCEDURE — P9604 ONE-WAY ALLOW PRORATED TRIP: HCPCS | Performed by: NURSE PRACTITIONER

## 2025-02-17 ENCOUNTER — LAB REQUISITION (OUTPATIENT)
Dept: LAB | Facility: CLINIC | Age: 65
End: 2025-02-17
Payer: COMMERCIAL

## 2025-02-17 ENCOUNTER — TRANSITIONAL CARE UNIT VISIT (OUTPATIENT)
Dept: GERIATRICS | Facility: CLINIC | Age: 65
End: 2025-02-17
Payer: COMMERCIAL

## 2025-02-17 VITALS
OXYGEN SATURATION: 91 % | HEART RATE: 115 BPM | HEIGHT: 64 IN | WEIGHT: 210 LBS | BODY MASS INDEX: 35.85 KG/M2 | SYSTOLIC BLOOD PRESSURE: 120 MMHG | TEMPERATURE: 98.2 F | DIASTOLIC BLOOD PRESSURE: 65 MMHG | RESPIRATION RATE: 18 BRPM

## 2025-02-17 DIAGNOSIS — G47.33 OSA (OBSTRUCTIVE SLEEP APNEA): ICD-10-CM

## 2025-02-17 DIAGNOSIS — K59.09 CHRONIC CONSTIPATION: ICD-10-CM

## 2025-02-17 DIAGNOSIS — J18.9 COMMUNITY ACQUIRED PNEUMONIA OF LEFT LOWER LOBE OF LUNG: ICD-10-CM

## 2025-02-17 DIAGNOSIS — R29.898 RIGHT LEG WEAKNESS: Primary | ICD-10-CM

## 2025-02-17 DIAGNOSIS — R63.8 OTHER SYMPTOMS AND SIGNS CONCERNING FOOD AND FLUID INTAKE: ICD-10-CM

## 2025-02-17 DIAGNOSIS — Z98.1 HX OF SPINAL FUSION: ICD-10-CM

## 2025-02-17 DIAGNOSIS — Z98.890 S/P INSERTION OF INTRATHECAL PUMP: ICD-10-CM

## 2025-02-17 DIAGNOSIS — E03.9 ACQUIRED HYPOTHYROIDISM: ICD-10-CM

## 2025-02-17 DIAGNOSIS — I10 BENIGN ESSENTIAL HYPERTENSION: ICD-10-CM

## 2025-02-17 DIAGNOSIS — R53.81 PHYSICAL DECONDITIONING: ICD-10-CM

## 2025-02-17 DIAGNOSIS — I50.30 HEART FAILURE WITH PRESERVED EJECTION FRACTION, NYHA CLASS I (H): ICD-10-CM

## 2025-02-17 DIAGNOSIS — N32.81 OVERACTIVE BLADDER: ICD-10-CM

## 2025-02-17 DIAGNOSIS — F11.90 CHRONIC, CONTINUOUS USE OF OPIOIDS: ICD-10-CM

## 2025-02-17 DIAGNOSIS — R19.7 DIARRHEA, UNSPECIFIED: ICD-10-CM

## 2025-02-17 DIAGNOSIS — K52.9 GASTROENTERITIS: ICD-10-CM

## 2025-02-17 DIAGNOSIS — I50.9 HEART FAILURE, UNSPECIFIED (H): ICD-10-CM

## 2025-02-17 LAB
GAMMA INTERFERON BACKGROUND BLD IA-ACNC: 0.03 IU/ML
M TB IFN-G BLD-IMP: NEGATIVE
M TB IFN-G CD4+ BCKGRND COR BLD-ACNC: 3.59 IU/ML
MITOGEN IGNF BCKGRD COR BLD-ACNC: 0 IU/ML
MITOGEN IGNF BCKGRD COR BLD-ACNC: 0.01 IU/ML
QUANTIFERON MITOGEN: 3.62 IU/ML
QUANTIFERON NIL TUBE: 0.03 IU/ML
QUANTIFERON TB1 TUBE: 0.04 IU/ML
QUANTIFERON TB2 TUBE: 0.03

## 2025-02-17 PROCEDURE — 99310 SBSQ NF CARE HIGH MDM 45: CPT | Performed by: NURSE PRACTITIONER

## 2025-02-17 RX ORDER — LIDOCAINE 40 MG/G
CREAM TOPICAL 3 TIMES DAILY
COMMUNITY

## 2025-02-17 RX ORDER — BUMETANIDE 0.5 MG/1
0.5 TABLET ORAL
COMMUNITY

## 2025-02-17 RX ORDER — LOPERAMIDE HYDROCHLORIDE 2 MG/1
2 TABLET ORAL 4 TIMES DAILY PRN
COMMUNITY
Start: 2025-02-16 | End: 2025-02-20

## 2025-02-17 RX ORDER — METHOCARBAMOL 500 MG/1
500 TABLET, FILM COATED ORAL EVERY 6 HOURS PRN
COMMUNITY

## 2025-02-17 RX ORDER — ELETRIPTAN HYDROBROMIDE 20 MG/1
20 TABLET, FILM COATED ORAL 2 TIMES DAILY PRN
COMMUNITY

## 2025-02-17 RX ORDER — LIDOCAINE 4 G/G
1 PATCH TOPICAL EVERY 24 HOURS
COMMUNITY

## 2025-02-17 NOTE — PATIENT INSTRUCTIONS
[facility]iatric[facility]   2025     Name: Lisbet Sanchez   : 1960       Orders:  - Check stool sample to r/o C diff and norovirus dx diarrhea  - Check CBC, CMP on 25 dx diarrhea, decreased appetite, CHF  - Add to miralax: Hold if loose stools      Electronically signed by  MELODY Jacobsen CNP on 2025 at 2:56 PM

## 2025-02-17 NOTE — LETTER
2/17/2025      Lisbet Sanchez  [address]  [place] [address]        [facility] GERIATRICS    PRIMARY CARE PROVIDER AND CLINIC:  Aracely Nguyen MD, [address] / [place] [address]  Chief Complaint   Patient presents with     [facility] Medical Record Number:  [id number]  Place of Service where encounter took place:  [facility] ([facility]) [[id number]]    Lisbet Sanchez  is a 64 year old  (1960), admitted to the above facility from  [facility]. Hospital stay 2/7/25 through 2/11/25..       HPI:      PMH: Anxiety, CAD, chronic back pain, Chronic pain of both knees, Chronic, continuous use of opioids, hx COVID-19 (12/08/2023), DDD (degenerative disc disease) of lumbar spine, Depression, Hepatic steatosis, HTN, Hypothyroidism, Sleep apnea    Previously admitted at [facility] in 12/2024 with right leg weakness and fall.     Admitted to [facility] 2/7-2/11/25 due to fall with R lower extremity weakness. CT head, CT lumbar spine, cervical spine negative. CT thoracic spine with probable L lung base consolidation. Treated for CAP with levofloxacin.   Readmitted to [facility] 2/11-2/13/25 due to discharge to [facility] to get pain pump refilled with planned readmission. Plan for outpatient follow-up with Spine Clinic. PTA metoprolol decreased due to hypotension.    Transferred to [facility] on 2/13/25.      Today's concerns:    RN reports patient had multiple episodes of emesis and diarrhea x 1 yesterday.     During exam, patient seen resting in bed. Endorses having N/V/D yesterday, today continues to have nausea. Denies abdominal pain, fever or chills. Admits to chronic back pain, takes oxycodone w/relief. Plans to follow-up with [facility]. Reports having fair appetite due to recent GI symptoms. Denies chest pain, SOB, headache, syncope. PTA lives in a house w/6 stairs at home.      CODE  STATUS/ADVANCE DIRECTIVES DISCUSSION:  Full Code    ALLERGIES:   Allergies   Allergen Reactions     Cephalexin Anaphylaxis, Hives, Rash, Difficulty breathing, Shortness Of Breath and Unknown     Other Reaction(s): Not available, Swelling, lips/tongue, Unknown    Other reaction(s): Hives, Hives     Cephalosporins      Other Reaction(s): *Unknown     Vancomycin Other (See Comments)     neutropenia    Other Reaction(s): Neutropenia      PAST MEDICAL HISTORY: No past medical history on file.   PAST SURGICAL HISTORY:   has a past surgical history that includes IR Lumbar Puncture (10/31/2018).  FAMILY HISTORY: family history is not on file.  SOCIAL HISTORY:   reports that she has never smoked. She has never used smokeless tobacco. She reports current alcohol use. She reports that she does not use drugs.  Patient's living condition: lives with spouse    Post Discharge Medication Reconciliation Status:   MED REC REQUIRED  Post Medication Reconciliation Status: discharge medications reconciled and changed, per note/orders    Current Outpatient Medications   Medication Sig Dispense Refill     acetaminophen (TYLENOL) 325 MG tablet Take 650 mg by mouth every 6 hours as needed for mild pain.       ARIPiprazole (ABILIFY) 30 MG tablet Take 30 mg by mouth daily.       atorvastatin (LIPITOR) 10 MG tablet Take 10 mg by mouth daily.       bumetanide (BUMEX) 0.5 MG tablet Take 0.5 mg by mouth 2 times daily.       busPIRone (BUSPAR) 10 MG tablet Take 20 mg by mouth 2 times daily.       Calcium Carb-Cholecalciferol (CALCIUM 500 + D) 500-5 MG-MCG TABS Take 1 tablet by mouth daily.       clonazePAM (KLONOPIN) 0.5 MG tablet Take 1 tablet (0.5 mg) by mouth 3 times daily as needed for anxiety. 30 tablet 1     Cranberry 450 MG TABS Take 450 mg by mouth daily.       desvenlafaxine (PRISTIQ) 100 MG 24 hr tablet Take 100 mg by mouth daily.       eletriptan (RELPAX) 20 MG tablet Take 20 mg by mouth 2 times daily as needed for migraine.        gabapentin (GRALISE) 600 MG 24 hr tablet Take 600 mg by mouth 4 times daily.       lactulose (CHRONULAC) 10 GM/15ML solution Take 10 g by mouth 2 times daily.       levothyroxine (SYNTHROID/LEVOTHROID) 175 MCG tablet Take 175 mcg by mouth daily.       Lidocaine (LIDOCARE) 4 % Patch Place 1 patch onto the skin every 24 hours. To prevent lidocaine toxicity, patient should be patch free for 12 hrs daily.       lidocaine (LMX4) 4 % external cream Apply topically 3 times daily. Apply to low back       loperamide (IMODIUM A-D) 2 MG tablet Take 2 mg by mouth 4 times daily as needed for diarrhea.       methocarbamol (ROBAXIN) 500 MG tablet Take 500 mg by mouth every 6 hours as needed for muscle spasms.       metoprolol succinate ER (TOPROL XL) 25 MG 24 hr tablet Take 25 mg by mouth daily.       multivitamin (CENTRUM SILVER) tablet Take 1 tablet by mouth daily.       naloxone (NARCAN) 4 MG/0.1ML nasal spray Spray 4 mg into one nostril alternating nostrils as needed for opioid reversal. every 2-3 minutes until assistance arrives       ondansetron (ZOFRAN ODT) 4 MG ODT tab Take 4 mg by mouth every 8 hours as needed for nausea.       oxyBUTYnin (DITROPAN) 5 MG tablet Take 5 mg by mouth daily.       oxyCODONE (ROXICODONE) 5 MG tablet Take 15 mg by mouth every 6 hours as needed for severe pain.       polyethylene glycol (MIRALAX) 17 g packet Take 17 g by mouth daily.       pseudoePHEDrine (SUDAFED) 60 MG tablet Take 120 mg by mouth daily as needed.       senna-docusate (SENOKOT-S/PERICOLACE) 8.6-50 MG tablet Take 2 tablets by mouth 2 times daily as needed.       tiZANidine (ZANAFLEX) 2 MG tablet Take 4 mg by mouth every 6 hours as needed for muscle spasms.       No current facility-administered medications for this visit.       ROS:  10 point ROS of systems including Constitutional, Eyes, Respiratory, Cardiovascular, Gastroenterology, Genitourinary, Integumentary, Musculoskeletal, Psychiatric were all negative except for  "pertinent positives noted in my HPI.    Vitals:  /65   Pulse 115   Temp 98.2  F (36.8  C)   Resp 18   Ht 1.626 m (5' 4\")   Wt 95.3 kg (210 lb)   SpO2 (!) 91%   BMI 36.05 kg/m    Exam:  GENERAL APPEARANCE:  Alert, in no distress, appears healthy, oriented, cooperative  ENT:  Mouth and posterior oropharynx normal, moist mucous membranes, normal hearing acuity  EYES:  EOM, conjunctivae, lids, pupils and irises normal, PERRL  RESP:  respiratory effort and palpation of chest normal, lungs clear to auscultation , no respiratory distress  CV:  regular rate and rhythm, no murmur, rub, or gallop, no edema  ABDOMEN:  normal bowel sounds, soft, nontender, no guarding or rebound  M/S:   Gait and station abnormal , resting in bed. Digits and nails normal  SKIN:  Inspection of skin and subcutaneous tissue baseline, Palpation of skin and subcutaneous tissue baseline  NEURO:   Cranial nerves 2-12 are normal tested and grossly at patient's baseline, Examination of sensation by touch normal  PSYCH:  oriented X 3, affect and mood normal        Lab/Diagnostic data:  Recent labs in [facility] reviewed by me today.      ASSESSMENT/PLAN:    (R29.898) Right leg weakness  (primary encounter diagnosis)  (R53.81) Physical deconditioning  (Z98.1) Hx of spinal fusion  Comment: Chronic RLE weakness since 12/2024. Ongoing physical deconditioning secondary to RLE weakness and recurrent falls.   Plan:   - Continue PT, OT  - SW following for discharge planning. Goal is to discharge home.   - Follow-up with [facility] as directed    (J18.9) Community acquired pneumonia of left lower lobe of lung  Comment: CT thoracic spine with L lung base consolidation. Completed course of levofloxacin.  Plan:   - Monitor respiratory symptoms    (I50.30) Heart failure with preserved ejection fraction, NYHA class I (H)  (I10) Benign essential hypertension  Comment: Chronic CHF with preserved EF, HTN. Echo > 75%, left ventricular outflow tract " obstruction.   Plan:   - Continue bumex, metoprolol    (F11.90) Chronic, continuous use of opioids  (Z98.890) S/P insertion of intrathecal pump  (K59.09) Chronic constipation  Comment: Chronic opioid use s/p intrathecal pain pump with chronic constipation.   Plan:   - Continue methocarbamol, oxycodone  - Continue buspirone, clonazepam, melatonin  - Continue lactulose, miralax, senna PRN  - Follow-up with Pain Clinic as directed    (E03.9) Acquired hypothyroidism  Comment: Chronic, last TSH 0.02 on 2/8/25  Plan:   - Continue levothyroxine 175mcg every day     (N32.81) Overactive bladder  Comment: Chronic  Plan:   - Continue oxybutynin    (G47.33) KEITH (obstructive sleep apnea)  Comment: Chronic  Plan:   - Continue CPAP at night    (K52.9) Gastroenteritis  Comment: Acute N/V/D, known norovirus outbreak at facility.   Plan:   - Check stool sample to r/o C diff and norovirus dx diarrhea  - Check CBC, CMP on 2/18/25 dx diarrhea, decreased appetite, CHF  - Add to miralax: Hold if loose stools      Orders:  - Check stool sample to r/o C diff and norovirus dx diarrhea  - Check CBC, CMP on 2/18/25 dx diarrhea, decreased appetite, CHF  - Add to miralax: Hold if loose stools        Total time spent during today's visit was 50 mins including patient visit and review of past records.     Electronically signed by:  MELODY Jacobsen CNP           Sincerely,        MELODY Jacobsen CNP    Electronically signed

## 2025-02-17 NOTE — PROGRESS NOTES
[facility] GERIATRICS    PRIMARY CARE PROVIDER AND CLINIC:  Aracely Nguyen MD, [address] / [place] [address]  Chief Complaint   Patient presents with    [facility] Medical Record Number:  [id number]  Place of Service where encounter took place:  [facility] ([facility]) [[id number]]    Lisbet Sanchez  is a 64 year old  (1960), admitted to the above facility from  [facility]. Hospital stay 2/7/25 through 2/11/25..       HPI:      PMH: Anxiety, CAD, chronic back pain, Chronic pain of both knees, Chronic, continuous use of opioids, hx COVID-19 (12/08/2023), DDD (degenerative disc disease) of lumbar spine, Depression, Hepatic steatosis, HTN, Hypothyroidism, Sleep apnea    Previously admitted at [facility] in 12/2024 with right leg weakness and fall.     Admitted to [facility] 2/7-2/11/25 due to fall with R lower extremity weakness. CT head, CT lumbar spine, cervical spine negative. CT thoracic spine with probable L lung base consolidation. Treated for CAP with levofloxacin.   Readmitted to [facility] 2/11-2/13/25 due to discharge to [facility] to get pain pump refilled with planned readmission. Plan for outpatient follow-up with [facility]ine Clinic. PTA metoprolol decreased due to hypotension.    Transferred to [facility] on 2/13/25.      Today's concerns:    RN reports patient had multiple episodes of emesis and diarrhea x 1 yesterday.     During exam, patient seen resting in bed. Endorses having N/V/D yesterday, today continues to have nausea. Denies abdominal pain, fever or chills. Admits to chronic back pain, takes oxycodone w/relief. Plans to follow-up with [facility]. Reports having fair appetite due to recent GI symptoms. Denies chest pain, SOB, headache, syncope. PTA lives in a house w/6 stairs at home.      CODE STATUS/ADVANCE DIRECTIVES DISCUSSION:  Full Code    ALLERGIES:   Allergies   Allergen  Reactions    Cephalexin Anaphylaxis, Hives, Rash, Difficulty breathing, Shortness Of Breath and Unknown     Other Reaction(s): Not available, Swelling, lips/tongue, Unknown    Other reaction(s): Hives, Hives    Cephalosporins      Other Reaction(s): *Unknown    Vancomycin Other (See Comments)     neutropenia    Other Reaction(s): Neutropenia      PAST MEDICAL HISTORY: No past medical history on file.   PAST SURGICAL HISTORY:   has a past surgical history that includes IR Lumbar Puncture (10/31/2018).  FAMILY HISTORY: family history is not on file.  SOCIAL HISTORY:   reports that she has never smoked. She has never used smokeless tobacco. She reports current alcohol use. She reports that she does not use drugs.  Patient's living condition: lives with spouse    Post Discharge Medication Reconciliation Status:   MED REC REQUIRED  Post Medication Reconciliation Status: discharge medications reconciled and changed, per note/orders    Current Outpatient Medications   Medication Sig Dispense Refill    acetaminophen (TYLENOL) 325 MG tablet Take 650 mg by mouth every 6 hours as needed for mild pain.      ARIPiprazole (ABILIFY) 30 MG tablet Take 30 mg by mouth daily.      atorvastatin (LIPITOR) 10 MG tablet Take 10 mg by mouth daily.      bumetanide (BUMEX) 0.5 MG tablet Take 0.5 mg by mouth 2 times daily.      busPIRone (BUSPAR) 10 MG tablet Take 20 mg by mouth 2 times daily.      Calcium Carb-Cholecalciferol (CALCIUM 500 + D) 500-5 MG-MCG TABS Take 1 tablet by mouth daily.      clonazePAM (KLONOPIN) 0.5 MG tablet Take 1 tablet (0.5 mg) by mouth 3 times daily as needed for anxiety. 30 tablet 1    Cranberry 450 MG TABS Take 450 mg by mouth daily.      desvenlafaxine (PRISTIQ) 100 MG 24 hr tablet Take 100 mg by mouth daily.      eletriptan (RELPAX) 20 MG tablet Take 20 mg by mouth 2 times daily as needed for migraine.      gabapentin (GRALISE) 600 MG 24 hr tablet Take 600 mg by mouth 4 times daily.      lactulose (CHRONULAC)  10 GM/15ML solution Take 10 g by mouth 2 times daily.      levothyroxine (SYNTHROID/LEVOTHROID) 175 MCG tablet Take 175 mcg by mouth daily.      Lidocaine (LIDOCARE) 4 % Patch Place 1 patch onto the skin every 24 hours. To prevent lidocaine toxicity, patient should be patch free for 12 hrs daily.      lidocaine (LMX4) 4 % external cream Apply topically 3 times daily. Apply to low back      loperamide (IMODIUM A-D) 2 MG tablet Take 2 mg by mouth 4 times daily as needed for diarrhea.      methocarbamol (ROBAXIN) 500 MG tablet Take 500 mg by mouth every 6 hours as needed for muscle spasms.      metoprolol succinate ER (TOPROL XL) 25 MG 24 hr tablet Take 25 mg by mouth daily.      multivitamin (CENTRUM SILVER) tablet Take 1 tablet by mouth daily.      naloxone (NARCAN) 4 MG/0.1ML nasal spray Spray 4 mg into one nostril alternating nostrils as needed for opioid reversal. every 2-3 minutes until assistance arrives      ondansetron (ZOFRAN ODT) 4 MG ODT tab Take 4 mg by mouth every 8 hours as needed for nausea.      oxyBUTYnin (DITROPAN) 5 MG tablet Take 5 mg by mouth daily.      oxyCODONE (ROXICODONE) 5 MG tablet Take 15 mg by mouth every 6 hours as needed for severe pain.      polyethylene glycol (MIRALAX) 17 g packet Take 17 g by mouth daily.      pseudoePHEDrine (SUDAFED) 60 MG tablet Take 120 mg by mouth daily as needed.      senna-docusate (SENOKOT-S/PERICOLACE) 8.6-50 MG tablet Take 2 tablets by mouth 2 times daily as needed.      tiZANidine (ZANAFLEX) 2 MG tablet Take 4 mg by mouth every 6 hours as needed for muscle spasms.       No current facility-administered medications for this visit.       ROS:  10 point ROS of systems including Constitutional, Eyes, Respiratory, Cardiovascular, Gastroenterology, Genitourinary, Integumentary, Musculoskeletal, Psychiatric were all negative except for pertinent positives noted in my HPI.    Vitals:  /65   Pulse 115   Temp 98.2  F (36.8  C)   Resp 18   Ht 1.626 m (5'  "4\")   Wt 95.3 kg (210 lb)   SpO2 (!) 91%   BMI 36.05 kg/m    Exam:  GENERAL APPEARANCE:  Alert, in no distress, appears healthy, oriented, cooperative  ENT:  Mouth and posterior oropharynx normal, moist mucous membranes, normal hearing acuity  EYES:  EOM, conjunctivae, lids, pupils and irises normal, PERRL  RESP:  respiratory effort and palpation of chest normal, lungs clear to auscultation , no respiratory distress  CV:  regular rate and rhythm, no murmur, rub, or gallop, no edema  ABDOMEN:  normal bowel sounds, soft, nontender, no guarding or rebound  M/S:   Gait and station abnormal , resting in bed. Digits and nails normal  SKIN:  Inspection of skin and subcutaneous tissue baseline, Palpation of skin and subcutaneous tissue baseline  NEURO:   Cranial nerves 2-12 are normal tested and grossly at patient's baseline, Examination of sensation by touch normal  PSYCH:  oriented X 3, affect and mood normal        Lab/Diagnostic data:  Recent labs in [facility] reviewed by me today.      ASSESSMENT/PLAN:    (R29.898) Right leg weakness  (primary encounter diagnosis)  (R53.81) Physical deconditioning  (Z98.1) Hx of spinal fusion  Comment: Chronic RLE weakness since 12/2024. Ongoing physical deconditioning secondary to RLE weakness and recurrent falls.   Plan:   - Continue PT, OT  - SW following for discharge planning. Goal is to discharge home.   - Follow-up with [facility] as directed    (J18.9) Community acquired pneumonia of left lower lobe of lung  Comment: CT thoracic spine with L lung base consolidation. Completed course of levofloxacin.  Plan:   - Monitor respiratory symptoms    (I50.30) Heart failure with preserved ejection fraction, NYHA class I (H)  (I10) Benign essential hypertension  Comment: Chronic CHF with preserved EF, HTN. Echo > 75%, left ventricular outflow tract obstruction.   Plan:   - Continue bumex, metoprolol    (F11.90) Chronic, continuous use of opioids  (Z98.890) S/P insertion of " intrathecal pump  (K59.09) Chronic constipation  Comment: Chronic opioid use s/p intrathecal pain pump with chronic constipation.   Plan:   - Continue methocarbamol, oxycodone  - Continue buspirone, clonazepam, melatonin  - Continue lactulose, miralax, senna PRN  - Follow-up with Pain Clinic as directed    (E03.9) Acquired hypothyroidism  Comment: Chronic, last TSH 0.02 on 2/8/25  Plan:   - Continue levothyroxine 175mcg every day     (N32.81) Overactive bladder  Comment: Chronic  Plan:   - Continue oxybutynin    (G47.33) KEITH (obstructive sleep apnea)  Comment: Chronic  Plan:   - Continue CPAP at night    (K52.9) Gastroenteritis  Comment: Acute N/V/D, known norovirus outbreak at facility.   Plan:   - Check stool sample to r/o C diff and norovirus dx diarrhea  - Check CBC, CMP on 2/18/25 dx diarrhea, decreased appetite, CHF  - Add to miralax: Hold if loose stools      Orders:  - Check stool sample to r/o C diff and norovirus dx diarrhea  - Check CBC, CMP on 2/18/25 dx diarrhea, decreased appetite, CHF  - Add to miralax: Hold if loose stools        Total time spent during today's visit was 50 mins including patient visit and review of past records.     Electronically signed by:  MELODY Jacobsen CNP

## 2025-02-18 ENCOUNTER — DOCUMENTATION ONLY (OUTPATIENT)
Dept: OTHER | Facility: CLINIC | Age: 65
End: 2025-02-18
Payer: COMMERCIAL

## 2025-02-18 LAB
ALBUMIN SERPL BCG-MCNC: 3.5 G/DL (ref 3.5–5.2)
ALP SERPL-CCNC: 122 U/L (ref 40–150)
ALT SERPL W P-5'-P-CCNC: 46 U/L (ref 0–50)
ANION GAP SERPL CALCULATED.3IONS-SCNC: 15 MMOL/L (ref 7–15)
AST SERPL W P-5'-P-CCNC: 65 U/L (ref 0–45)
BILIRUB SERPL-MCNC: 0.4 MG/DL
BUN SERPL-MCNC: 25.5 MG/DL (ref 8–23)
CALCIUM SERPL-MCNC: 8.3 MG/DL (ref 8.8–10.4)
CHLORIDE SERPL-SCNC: 100 MMOL/L (ref 98–107)
CREAT SERPL-MCNC: 0.65 MG/DL (ref 0.51–0.95)
EGFRCR SERPLBLD CKD-EPI 2021: >90 ML/MIN/1.73M2
ERYTHROCYTE [DISTWIDTH] IN BLOOD BY AUTOMATED COUNT: 13.6 % (ref 10–15)
GLUCOSE SERPL-MCNC: 101 MG/DL (ref 70–99)
HCO3 SERPL-SCNC: 24 MMOL/L (ref 22–29)
HCT VFR BLD AUTO: 38.2 % (ref 35–47)
HGB BLD-MCNC: 11.7 G/DL (ref 11.7–15.7)
MCH RBC QN AUTO: 26.3 PG (ref 26.5–33)
MCHC RBC AUTO-ENTMCNC: 30.6 G/DL (ref 31.5–36.5)
MCV RBC AUTO: 86 FL (ref 78–100)
PLATELET # BLD AUTO: 230 10E3/UL (ref 150–450)
POTASSIUM SERPL-SCNC: 3.6 MMOL/L (ref 3.4–5.3)
PROT SERPL-MCNC: 6.2 G/DL (ref 6.4–8.3)
RBC # BLD AUTO: 4.45 10E6/UL (ref 3.8–5.2)
SODIUM SERPL-SCNC: 139 MMOL/L (ref 135–145)
WBC # BLD AUTO: 7.9 10E3/UL (ref 4–11)

## 2025-02-18 PROCEDURE — 84155 ASSAY OF PROTEIN SERUM: CPT | Performed by: NURSE PRACTITIONER

## 2025-02-18 PROCEDURE — P9604 ONE-WAY ALLOW PRORATED TRIP: HCPCS | Performed by: NURSE PRACTITIONER

## 2025-02-18 PROCEDURE — 82310 ASSAY OF CALCIUM: CPT | Performed by: NURSE PRACTITIONER

## 2025-02-18 PROCEDURE — 85018 HEMOGLOBIN: CPT | Performed by: NURSE PRACTITIONER

## 2025-02-18 PROCEDURE — 36415 COLL VENOUS BLD VENIPUNCTURE: CPT | Performed by: NURSE PRACTITIONER

## 2025-02-18 RX ORDER — OXYCODONE HYDROCHLORIDE 5 MG/1
15 TABLET ORAL EVERY 6 HOURS PRN
Qty: 60 TABLET | Refills: 0 | Status: SHIPPED | OUTPATIENT
Start: 2025-02-18 | End: 2025-02-20 | Stop reason: DRUGHIGH

## 2025-02-19 VITALS
BODY MASS INDEX: 35.85 KG/M2 | WEIGHT: 210 LBS | RESPIRATION RATE: 18 BRPM | DIASTOLIC BLOOD PRESSURE: 51 MMHG | HEART RATE: 72 BPM | SYSTOLIC BLOOD PRESSURE: 109 MMHG | HEIGHT: 64 IN | OXYGEN SATURATION: 95 % | TEMPERATURE: 97.2 F

## 2025-02-19 NOTE — PROGRESS NOTES
"[facility] GERIATRICS    Chief Complaint   Patient presents with    RECHECK     HPI:  Lisbet Sanchez is a 64 year old  (1960), who is being seen today for an episodic care visit at: [facility] [[id number]].       Today's concern is: ***    No V/D  Sweaty to bathroom/activity   Denies coughing   Levothyroxine taking double dose x 1 week February        Allergies, and PMH/PSH reviewed in [facility] today.    REVIEW OF SYSTEMS:  {eofmuw49:533662}      Objective:   /51   Pulse 72   Temp 97.2  F (36.2  C)   Resp 18   Ht 1.626 m (5' 4\")   Wt 95.3 kg (210 lb)   SpO2 95%   BMI 36.05 kg/m    {Nursing home physical exam :492991}    Wt Readings from Last 4 Encounters:   02/19/25 95.3 kg (210 lb)   02/17/25 95.3 kg (210 lb)   12/19/24 93.8 kg (206 lb 12.8 oz)   12/17/24 93.1 kg (205 lb 4.8 oz)       Recent labs in [facility] reviewed by me today.   Most Recent 3 CBC's:  Recent Labs   Lab Test 02/18/25  0524 12/16/24  0604   WBC 7.9 8.2   HGB 11.7 9.3*   MCV 86 91    263     Most Recent 3 BMP's:  Recent Labs   Lab Test 02/18/25  0524 12/16/24  0604    142   POTASSIUM 3.6 4.5   CHLORIDE 100 103   CO2 24 28   BUN 25.5* 16.5   CR 0.65 0.66   ANIONGAP 15 11   MARIELY 8.3* 9.2   * 98     Liver Function Studies -   Recent Labs   Lab Test 02/18/25  0524   PROTTOTAL 6.2*   ALBUMIN 3.5   BILITOTAL 0.4   ALKPHOS 122   AST 65*   ALT 46         Assessment/Plan:    {FGS DX2:682372}      - ST following    [facility]  - Oxycodone 15mg 8am, 12pm ,4pm, 8pm   - Oxycodone PRN at bedtime   Next refill pump April        Orders:  - Check orthostatic vital signs  - Check CBC, BMP, TSH on 2/21/25 dx decreased appetite, hypothyroidism  - Discontinue tizanidine PRN  - Discontinue current oxycodone orders  - Add oxycodone 15mg QID (8am, 12pm ,4pm, 8pm) dx chronic pain  - Add oxycodone 15mg at bedtime (do not give within 4hrs of scheduled oxycodone dose) dx chronic pain      Electronically " signed by: MELODY Jacobsen CNP        CBC, BMP, TSH on 2/21/25 dx decreased appetite, hypothyroidism  - Discontinue tizanidine PRN  - Discontinue current oxycodone orders  - Add oxycodone 15mg QID (8am, 12pm ,4pm, 8pm) dx chronic pain  - Add oxycodone 15mg at bedtime (do not give within 4hrs of scheduled oxycodone dose) dx chronic pain      Electronically signed by: MELODY Jacobsen CNP

## 2025-02-20 ENCOUNTER — LAB REQUISITION (OUTPATIENT)
Dept: LAB | Facility: CLINIC | Age: 65
End: 2025-02-20
Payer: COMMERCIAL

## 2025-02-20 ENCOUNTER — TRANSITIONAL CARE UNIT VISIT (OUTPATIENT)
Dept: GERIATRICS | Facility: CLINIC | Age: 65
End: 2025-02-20
Payer: COMMERCIAL

## 2025-02-20 DIAGNOSIS — R53.81 PHYSICAL DECONDITIONING: ICD-10-CM

## 2025-02-20 DIAGNOSIS — Z98.890 S/P INSERTION OF INTRATHECAL PUMP: ICD-10-CM

## 2025-02-20 DIAGNOSIS — R63.0 DECREASED APPETITE: ICD-10-CM

## 2025-02-20 DIAGNOSIS — E63.9 NUTRITIONAL DEFICIENCY, UNSPECIFIED: ICD-10-CM

## 2025-02-20 DIAGNOSIS — E03.9 ACQUIRED HYPOTHYROIDISM: ICD-10-CM

## 2025-02-20 DIAGNOSIS — F11.90 CHRONIC, CONTINUOUS USE OF OPIOIDS: Primary | ICD-10-CM

## 2025-02-20 DIAGNOSIS — R29.898 RIGHT LEG WEAKNESS: ICD-10-CM

## 2025-02-20 DIAGNOSIS — E03.9 HYPOTHYROIDISM, UNSPECIFIED: ICD-10-CM

## 2025-02-20 PROCEDURE — 99309 SBSQ NF CARE MODERATE MDM 30: CPT | Performed by: NURSE PRACTITIONER

## 2025-02-20 RX ORDER — OXYCODONE HYDROCHLORIDE 5 MG/1
TABLET ORAL
Qty: 90 TABLET | Refills: 0 | Status: SHIPPED | OUTPATIENT
Start: 2025-02-20

## 2025-02-20 NOTE — LETTER
2/20/2025      Lisbet Sanchez  [address] [place] [id number]        No notes on file      Sincerely,        MELODY Jacobsen CNP    Electronically signed     12/19/24 93.8 kg (206 lb 12.8 oz)   12/17/24 93.1 kg (205 lb 4.8 oz)       Recent labs in [facility] reviewed by me today.   Most Recent 3 CBC's:  Recent Labs   Lab Test 02/18/25  0524 12/16/24  0604   WBC 7.9 8.2   HGB 11.7 9.3*   MCV 86 91    263     Most Recent 3 BMP's:  Recent Labs   Lab Test 02/18/25  0524 12/16/24  0604    142   POTASSIUM 3.6 4.5   CHLORIDE 100 103   CO2 24 28   BUN 25.5* 16.5   CR 0.65 0.66   ANIONGAP 15 11   MARIELY 8.3* 9.2   * 98     Liver Function Studies -   Recent Labs   Lab Test 02/18/25 0524   PROTTOTAL 6.2*   ALBUMIN 3.5   BILITOTAL 0.4   ALKPHOS 122   AST 65*   ALT 46         Assessment/Plan:    (F11.90) Chronic, continuous use of opioids  (primary encounter diagnosis)  (Z98.890) S/P insertion of intrathecal pump  Comment: Chronic opioid use s/p intrathecal pain pump  Plan:   - Discontinue tizanidine PRN  - Discontinue current oxycodone orders  - Add oxycodone 15mg QID (8am, 12pm ,4pm, 8pm) dx chronic pain  - Add oxycodone 15mg at bedtime (do not give within 4hrs of scheduled oxycodone dose) dx chronic pain  - Continue robaxin, lidocaine patch  - Fuw with [facility] as directed, next refill for pain pump in April    (R63.0) Decreased appetite  Comment: Ongoing decreased appetite with recent GI symptoms earlier this week.   Plan:   - Check CBC, BMP on 2/21/25 dx decreased appetite, hypothyroidism  - ST following due to concern w/possible dysphagia    (R29.898) Right leg weakness  (R53.81) Physical deconditioning  Comment:  Chronic RLE weakness since 12/2024. Ongoing physical deconditioning secondary to RLE weakness and recurrent falls. Patient reports feeling sweaty w/activity.   Plan:   - Check orthostatic hypotension  - Continue PT, OT  - SW following for discharge planning. Goal is to discharge home.   - Follow-up with [facility] as directed    (E03.9) Acquired hypothyroidism  Comment: Chronic, last TSH 0.02 on 2/8/25  Plan:   - Check TSH on 2/21/25   -  Continue levothyroxine 175mcg every day       Orders:  - Check orthostatic vital signs  - Check CBC, BMP, TSH on 2/21/25 dx decreased appetite, hypothyroidism  - Discontinue tizanidine PRN  - Discontinue current oxycodone orders  - Add oxycodone 15mg QID (8am, 12pm ,4pm, 8pm) dx chronic pain  - Add oxycodone 15mg at bedtime (do not give within 4hrs of scheduled oxycodone dose) dx chronic pain      Electronically signed by: MELODY Jacobsen CNP           Sincerely,        MELODY Jacobsen CNP    Electronically signed

## 2025-02-21 LAB
ANION GAP SERPL CALCULATED.3IONS-SCNC: 10 MMOL/L (ref 7–15)
BUN SERPL-MCNC: 10.2 MG/DL (ref 8–23)
CALCIUM SERPL-MCNC: 8.7 MG/DL (ref 8.8–10.4)
CHLORIDE SERPL-SCNC: 100 MMOL/L (ref 98–107)
CREAT SERPL-MCNC: 0.73 MG/DL (ref 0.51–0.95)
EGFRCR SERPLBLD CKD-EPI 2021: >90 ML/MIN/1.73M2
ERYTHROCYTE [DISTWIDTH] IN BLOOD BY AUTOMATED COUNT: 13.4 % (ref 10–15)
GLUCOSE SERPL-MCNC: 115 MG/DL (ref 70–99)
HCO3 SERPL-SCNC: 32 MMOL/L (ref 22–29)
HCT VFR BLD AUTO: 36.7 % (ref 35–47)
HGB BLD-MCNC: 11.5 G/DL (ref 11.7–15.7)
MCH RBC QN AUTO: 26.2 PG (ref 26.5–33)
MCHC RBC AUTO-ENTMCNC: 31.3 G/DL (ref 31.5–36.5)
MCV RBC AUTO: 84 FL (ref 78–100)
PLATELET # BLD AUTO: 225 10E3/UL (ref 150–450)
POTASSIUM SERPL-SCNC: 2.9 MMOL/L (ref 3.4–5.3)
RBC # BLD AUTO: 4.39 10E6/UL (ref 3.8–5.2)
SODIUM SERPL-SCNC: 142 MMOL/L (ref 135–145)
TSH SERPL DL<=0.005 MIU/L-ACNC: 0.56 UIU/ML (ref 0.3–4.2)
WBC # BLD AUTO: 4.8 10E3/UL (ref 4–11)

## 2025-02-21 PROCEDURE — 82310 ASSAY OF CALCIUM: CPT | Performed by: INTERNAL MEDICINE

## 2025-02-21 PROCEDURE — 84520 ASSAY OF UREA NITROGEN: CPT | Performed by: INTERNAL MEDICINE

## 2025-02-21 PROCEDURE — 36415 COLL VENOUS BLD VENIPUNCTURE: CPT | Performed by: INTERNAL MEDICINE

## 2025-02-21 PROCEDURE — P9604 ONE-WAY ALLOW PRORATED TRIP: HCPCS | Performed by: INTERNAL MEDICINE

## 2025-02-21 PROCEDURE — 80048 BASIC METABOLIC PNL TOTAL CA: CPT | Performed by: INTERNAL MEDICINE

## 2025-02-21 PROCEDURE — 84443 ASSAY THYROID STIM HORMONE: CPT | Performed by: INTERNAL MEDICINE

## 2025-02-21 PROCEDURE — 85014 HEMATOCRIT: CPT | Performed by: INTERNAL MEDICINE

## 2025-02-23 ENCOUNTER — LAB REQUISITION (OUTPATIENT)
Dept: LAB | Facility: CLINIC | Age: 65
End: 2025-02-23
Payer: COMMERCIAL

## 2025-02-23 DIAGNOSIS — E87.6 HYPOKALEMIA: ICD-10-CM

## 2025-02-23 NOTE — CONFIDENTIAL NOTE
[facility]   2025     Name: Lisbet Sanchez   : 1960     Background:  Hypokalemia    Orders:  - Give potassium chloride 40meq in the morning and 20meq at 1600 on 25 for hypokalemia (ok to take from ekit)  - Recheck BMP on 25 dx hypokalemia         Electronically signed by   MELODY Jacobsen CNP on 2025 at 10:59 PM

## 2025-02-24 ENCOUNTER — TRANSITIONAL CARE UNIT VISIT (OUTPATIENT)
Dept: GERIATRICS | Facility: CLINIC | Age: 65
End: 2025-02-24
Payer: COMMERCIAL

## 2025-02-24 VITALS
TEMPERATURE: 97.8 F | OXYGEN SATURATION: 93 % | HEIGHT: 64 IN | HEART RATE: 59 BPM | WEIGHT: 205.6 LBS | BODY MASS INDEX: 35.1 KG/M2 | SYSTOLIC BLOOD PRESSURE: 118 MMHG | RESPIRATION RATE: 18 BRPM | DIASTOLIC BLOOD PRESSURE: 59 MMHG

## 2025-02-24 DIAGNOSIS — F11.90 CHRONIC, CONTINUOUS USE OF OPIOIDS: ICD-10-CM

## 2025-02-24 DIAGNOSIS — R29.898 RIGHT LEG WEAKNESS: Primary | ICD-10-CM

## 2025-02-24 DIAGNOSIS — R13.10 DYSPHAGIA, UNSPECIFIED TYPE: ICD-10-CM

## 2025-02-24 DIAGNOSIS — Z98.890 S/P INSERTION OF INTRATHECAL PUMP: ICD-10-CM

## 2025-02-24 DIAGNOSIS — R53.81 PHYSICAL DECONDITIONING: ICD-10-CM

## 2025-02-24 DIAGNOSIS — E87.6 HYPOKALEMIA: ICD-10-CM

## 2025-02-24 LAB
ANION GAP SERPL CALCULATED.3IONS-SCNC: 11 MMOL/L (ref 7–15)
BUN SERPL-MCNC: 8.8 MG/DL (ref 8–23)
CALCIUM SERPL-MCNC: 9.4 MG/DL (ref 8.8–10.4)
CHLORIDE SERPL-SCNC: 102 MMOL/L (ref 98–107)
CREAT SERPL-MCNC: 0.63 MG/DL (ref 0.51–0.95)
EGFRCR SERPLBLD CKD-EPI 2021: >90 ML/MIN/1.73M2
GLUCOSE SERPL-MCNC: 118 MG/DL (ref 70–99)
HCO3 SERPL-SCNC: 31 MMOL/L (ref 22–29)
POTASSIUM SERPL-SCNC: 4 MMOL/L (ref 3.4–5.3)
SODIUM SERPL-SCNC: 144 MMOL/L (ref 135–145)

## 2025-02-24 PROCEDURE — 82565 ASSAY OF CREATININE: CPT | Performed by: NURSE PRACTITIONER

## 2025-02-24 PROCEDURE — 36415 COLL VENOUS BLD VENIPUNCTURE: CPT | Performed by: NURSE PRACTITIONER

## 2025-02-24 PROCEDURE — 99309 SBSQ NF CARE MODERATE MDM 30: CPT | Performed by: NURSE PRACTITIONER

## 2025-02-24 PROCEDURE — P9604 ONE-WAY ALLOW PRORATED TRIP: HCPCS | Performed by: NURSE PRACTITIONER

## 2025-02-24 PROCEDURE — 80048 BASIC METABOLIC PNL TOTAL CA: CPT | Performed by: NURSE PRACTITIONER

## 2025-02-24 NOTE — PROGRESS NOTES
"[facility] GERIATRICS    Chief Complaint   Patient presents with    RECHECK     HPI:  Lisbet Sanchez is a 64 year old  (1960), who is being seen today for an episodic care visit at: [facility] ([facility]) [[id number]].      Today's concern is: ***        Allergies, and PMH/PSH reviewed in [facility] today.    REVIEW OF SYSTEMS:  {vfecfq67:899880}      Objective:   /59   Pulse 59   Temp 97.8  F (36.6  C)   Resp 18   Ht 1.626 m (5' 4\")   Wt 93.3 kg (205 lb 9.6 oz)   SpO2 93%   BMI 35.29 kg/m    {Nursing home physical exam :875378}      Recent labs in [facility] reviewed by me today.    Most Recent 3 CBC's:  Recent Labs   Lab Test 02/21/25  1107 02/18/25  0524 12/16/24  0604   WBC 4.8 7.9 8.2   HGB 11.5* 11.7 9.3*   MCV 84 86 91    230 263     Most Recent 3 BMP's:  Recent Labs   Lab Test 02/21/25  1107 02/18/25  0524 12/16/24  0604    139 142   POTASSIUM 2.9* 3.6 4.5   CHLORIDE 100 100 103   CO2 32* 24 28   BUN 10.2 25.5* 16.5   CR 0.73 0.65 0.66   ANIONGAP 10 15 11   MARIELY 8.7* 8.3* 9.2   * 101* 98         Assessment/Plan:    {FGS DX2:698780}        Last therapy update:  Transfers SBA  Bed mobility SBA  Ambulating 30' with FWW  Was doing well at [facility] but then had norovirus infection and has not been feeling well. Limited tolerance for being upright related to fatigue from illness      ST   01/2024      Potassium        Orders:  - Recheck BMP on 2/27/25 dx hypokalemia        Electronically signed by: MELODY Jacobsen CNP       " 30* 31* 32*   BUN 10.8 8.8 10.2   CR 0.74 0.63 0.73   ANIONGAP 9 11 10   MARIELY 9.0 9.4 8.7*   * 118* 115*         Assessment/Plan:    (R29.898) Right leg weakness  (R53.81) Physical deconditioning  Comment:  Chronic RLE weakness since 12/2024. Ongoing physical deconditioning secondary to RLE weakness and recurrent falls. Patient reports feeling sweaty w/activity.   Last therapy update:  Transfers SBA  Bed mobility SBA  Ambulating 30' with FWW  Was doing well at [facility] but then had norovirus infection and has not been feeling well. Limited tolerance for being upright related to fatigue from illness  Plan:   - Continue PT, OT  -  following for discharge planning. Goal is to discharge home.   - Follow-up with [facility] as directed    (F11.90) Chronic, continuous use of opioids   (Z98.890) S/P insertion of intrathecal pump  Comment: Chronic opioid use s/p intrathecal pain pump  Plan:   - Continue oxycodone 15mg QID and 15mg at bedtime PRN  - Continue robaxin, lidocaine patch  - Follow-up with [facility] as directed, next refill for pain pump in April     (R13.10) Dysphagia, unspecified type  Comment: Chronic dysphagia; patient reported difficulty swallowing since 01/2024.  Plan:   -  plans to schedule FEES this week  -  following    (E87.6) Hypokalemia  Comment: Acute hypokalemia, K+ recheck today WNL  Plan:   - Recheck BMP on 2/27      Orders:  - Recheck BMP on 2/27/25 dx hypokalemia        Electronically signed by: MELODY Jacobsen CNP

## 2025-02-24 NOTE — PATIENT INSTRUCTIONS
[facility]   2025     Name: Lisbet Sanchez   : 1960       Orders:  - Recheck BMP on 25 dx hypokalemia      Electronically signed by   MELODY Jacobsen CNP on 2025 at 1:27 PM

## 2025-02-26 ENCOUNTER — LAB REQUISITION (OUTPATIENT)
Dept: LAB | Facility: CLINIC | Age: 65
End: 2025-02-26
Payer: COMMERCIAL

## 2025-02-26 DIAGNOSIS — E87.6 HYPOKALEMIA: ICD-10-CM

## 2025-02-27 LAB
ANION GAP SERPL CALCULATED.3IONS-SCNC: 9 MMOL/L (ref 7–15)
BUN SERPL-MCNC: 10.8 MG/DL (ref 8–23)
CALCIUM SERPL-MCNC: 9 MG/DL (ref 8.8–10.4)
CHLORIDE SERPL-SCNC: 102 MMOL/L (ref 98–107)
CREAT SERPL-MCNC: 0.74 MG/DL (ref 0.51–0.95)
EGFRCR SERPLBLD CKD-EPI 2021: 89 ML/MIN/1.73M2
GLUCOSE SERPL-MCNC: 105 MG/DL (ref 70–99)
HCO3 SERPL-SCNC: 30 MMOL/L (ref 22–29)
POTASSIUM SERPL-SCNC: 4 MMOL/L (ref 3.4–5.3)
SODIUM SERPL-SCNC: 141 MMOL/L (ref 135–145)

## 2025-02-27 PROCEDURE — P9604 ONE-WAY ALLOW PRORATED TRIP: HCPCS | Performed by: NURSE PRACTITIONER

## 2025-02-27 PROCEDURE — 36415 COLL VENOUS BLD VENIPUNCTURE: CPT | Performed by: NURSE PRACTITIONER

## 2025-02-27 PROCEDURE — 80048 BASIC METABOLIC PNL TOTAL CA: CPT | Performed by: NURSE PRACTITIONER

## 2025-02-28 VITALS
WEIGHT: 207.2 LBS | RESPIRATION RATE: 18 BRPM | HEIGHT: 64 IN | HEART RATE: 64 BPM | TEMPERATURE: 97.9 F | OXYGEN SATURATION: 93 % | BODY MASS INDEX: 35.37 KG/M2 | SYSTOLIC BLOOD PRESSURE: 121 MMHG | DIASTOLIC BLOOD PRESSURE: 68 MMHG

## 2025-02-28 NOTE — PROGRESS NOTES
[facility] GERIATRICS    PRIMARY CARE PROVIDER AND CLINIC:  Aracely Nguyen MD, [address] / [place] [address]  Chief Complaint   Patient presents with    Hospital F/U     MD Initial      [place] Medical Record Number:  [id number]  Place of Service where encounter took place:  [facility] ([facility])     Lisbet Sanchez  is a 64 year old  (1960), admitted to the above facility from  [facility]. Hospital stay 2/7/25 through 2/11/25.     Hospital course was reviewed by me, is as per the hospital discharge summary and nurse practitioner note.    Patient has a past medical history of chronic pain status post, intrathecal pain pump placement,  chronic right lower extremity pain and weakness, peripheral neuropathy, history of spinal fusion degenerative disc disease, depression, migraine headaches hepatic steatosis, hypertension, chronic lower extremity edema, history of hyperdynamic LV systolic function with systolic anterior motion, dynamic left ventricular outflow obstruction, hypothyroidism      She presented to the hospital with ongoing right lower extremity pain and weakness following a fall.  CT lumbar spine on 2/7/2025 revealed no acute fracture.  Multilevel spondylosis present.  Surgical hardware intact grossly.  CT thoracic spine revealed postoperative changes, without acute fracture.  She has noted a several month history of right lower extremity pain and weakness which she believes is related to disruption of surgical hardware.  She has had to utilize a cane or a walker recently.  She is followed by neurosurgery and by the [facility] pain clinic.  Pain pump last refilled on 2/11/2025    She was treated for possible pneumonia with levofloxacin.  Viral studies negative  Lower extremity edema has been managed with Bumex.    Patient states low back, right lower extremity pain and weakness are overall improved, particularly over the last week, since  oxycodone was started on a scheduled basis.  She continues to receive Robaxin and a lidocaine patch.  She requires standby assistance with transferring.  She is ambulating short distances with a walker.  She does note chronic left knee pain related to arthritis, for which she normally takes Naprosyn would like this restarted.  She was ill with norovirus approximately 10 days ago and notes gradual improvement in her appetite.  Denies further diarrhea and actually has tended towards constipation  She has noted occasional dizziness with standing.  Denies chest pain, shortness of breath or cough      CODE STATUS/ADVANCE DIRECTIVES DISCUSSION:  Full Code    ALLERGIES:   Allergies   Allergen Reactions    Cephalexin Anaphylaxis, Hives, Rash, Difficulty breathing, Shortness Of Breath and Unknown     Other Reaction(s): Not available, Swelling, lips/tongue, Unknown    Other reaction(s): Hives, Hives    Cephalosporins      Other Reaction(s): *Unknown    Vancomycin Other (See Comments)     neutropenia    Other Reaction(s): Neutropenia      PAST MEDICAL HISTORY: No past medical history on file.   PAST SURGICAL HISTORY:   has a past surgical history that includes IR Lumbar Puncture (10/31/2018).  FAMILY HISTORY: family history is not on file.  SOCIAL HISTORY:   reports that she has never smoked. She has never used smokeless tobacco. She reports current alcohol use. She reports that she does not use drugs.  Patient's living condition: lives with spouse    Current medications were reviewed by me today    Current Outpatient Medications   Medication Sig Dispense Refill    acetaminophen (TYLENOL) 325 MG tablet Take 650 mg by mouth every 6 hours as needed for mild pain.      ARIPiprazole (ABILIFY) 30 MG tablet Take 30 mg by mouth daily.      atorvastatin (LIPITOR) 10 MG tablet Take 10 mg by mouth daily.      bumetanide (BUMEX) 0.5 MG tablet Take 0.5 mg by mouth 2 times daily.      busPIRone (BUSPAR) 10 MG tablet Take 20 mg by mouth 2  times daily.      Calcium Carb-Cholecalciferol (CALCIUM 500 + D) 500-5 MG-MCG TABS Take 1 tablet by mouth daily.      clonazePAM (KLONOPIN) 0.5 MG tablet Take 1 tablet (0.5 mg) by mouth 3 times daily as needed for anxiety. 30 tablet 1    Cranberry 450 MG TABS Take 450 mg by mouth daily.      desvenlafaxine (PRISTIQ) 100 MG 24 hr tablet Take 100 mg by mouth daily.      eletriptan (RELPAX) 20 MG tablet Take 20 mg by mouth 2 times daily as needed for migraine.      gabapentin (GRALISE) 600 MG 24 hr tablet Take 600 mg by mouth 4 times daily.      lactulose (CHRONULAC) 10 GM/15ML solution Take 10 g by mouth 2 times daily.      levothyroxine (SYNTHROID/LEVOTHROID) 175 MCG tablet Take 175 mcg by mouth daily.      Lidocaine (LIDOCARE) 4 % Patch Place 1 patch onto the skin every 24 hours. To prevent lidocaine toxicity, patient should be patch free for 12 hrs daily.      lidocaine (LMX4) 4 % external cream Apply topically 3 times daily. Apply to low back      methocarbamol (ROBAXIN) 500 MG tablet Take 500 mg by mouth every 6 hours as needed for muscle spasms.      metoprolol succinate ER (TOPROL XL) 25 MG 24 hr tablet Take 25 mg by mouth daily.      multivitamin (CENTRUM SILVER) tablet Take 1 tablet by mouth daily.      naloxone (NARCAN) 4 MG/0.1ML nasal spray Spray 4 mg into one nostril alternating nostrils as needed for opioid reversal. every 2-3 minutes until assistance arrives      ondansetron (ZOFRAN ODT) 4 MG ODT tab Take 4 mg by mouth every 8 hours as needed for nausea.      oxyBUTYnin (DITROPAN) 5 MG tablet Take 5 mg by mouth daily.      oxyCODONE (ROXICODONE) 5 MG tablet Take 3 tablets (15 mg) by mouth 4 times daily. May also take 3 tablets (15 mg) nightly as needed for pain. 90 tablet 0    polyethylene glycol (MIRALAX) 17 g packet Take 17 g by mouth daily.      pseudoePHEDrine (SUDAFED) 60 MG tablet Take 120 mg by mouth daily as needed.      senna-docusate (SENOKOT-S/PERICOLACE) 8.6-50 MG tablet Take 2 tablets by  "mouth 2 times daily as needed.       No current facility-administered medications for this visit.       ROS:  10 point ROS of systems including Constitutional, Eyes, Respiratory, Cardiovascular, Gastroenterology, Genitourinary, Integumentary, Musculoskeletal, Psychiatric were all negative except for pertinent positives noted in my HPI.    Vitals:  /68   Pulse 64   Temp 97.9  F (36.6  C)   Resp 18   Ht 1.626 m (5' 4\")   Wt 94 kg (207 lb 3.2 oz)   SpO2 93%   BMI 35.57 kg/m    Exam:  Obese female, lying comfortably in bed.  She is sleepy, easy alerts to name, is pleasant in good spirits  HEENT: Oral mucosa moist  Lungs clear  CV RRR  Abdomen soft, protuberant  Lower extremity trace ankle edema bilaterally.  Neuro: Cranial nerves intact.  Patient is able to lift right heel off the surface of the bed though is weaker than left leg.  Gait was not assessed by me today.    Lab/Diagnostic data:  Most Recent 3 CBC's:  Recent Labs   Lab Test 02/21/25  1107 02/18/25  0524 12/16/24  0604   WBC 4.8 7.9 8.2   HGB 11.5* 11.7 9.3*   MCV 84 86 91    230 263     Most Recent 3 BMP's:  Recent Labs   Lab Test 02/27/25  0651 02/24/25  1146 02/21/25  1107    144 142   POTASSIUM 4.0 4.0 2.9*   CHLORIDE 102 102 100   CO2 30* 31* 32*   BUN 10.8 8.8 10.2   CR 0.74 0.63 0.73   ANIONGAP 9 11 10   MARIELY 9.0 9.4 8.7*   * 118* 115*     Most Recent 2 LFT's:  Recent Labs   Lab Test 02/18/25  0524   AST 65*   ALT 46   ALKPHOS 122   BILITOTAL 0.4     Most Recent Hemoglobin A1c:No lab results found.  Most Recent Anemia Panel:  Recent Labs   Lab Test 02/21/25  1107   WBC 4.8   HGB 11.5*   HCT 36.7   MCV 84          ASSESSMENT/PLAN:    Acute on chronic low back and right lower extremity pain and weakness and patient with complex spine history status post spinal fusion, status post intrathecal pain pump placement, on chronic oral opioids in addition.  History of recent falls which has exacerbated the " situation.  Patient is followed by the pain clinic as well as spine surgery  She notes improvement in pain and overall function since admission to the TCU, with medication adjustments.  Plan: Continue therapies.  Continue current pain medication regimen.  Patient will require close follow-up with the pain clinic and with spine surgery  Monitor bowel status given risk of constipation    Recent possible left lower lobe community-acquired pneumonia status post treatment with Levaquin  No current respiratory symptoms  Plan: Monitor respiratory status    Heart failure with preserved ejection fraction, left ventricular outflow obstruction  Hypertension with episodic relatively low blood pressures over the last few weeks  Chronic lower extremity edema for which the patient is on diuretics, unclear if related to cardiac causes or immobility with venous insufficiency, medications including gabapentin  Diuretic and blood pressure medications will need to be used cautiously in view of cardiac profile.  Plan: Monitor vital signs, avoid hypotension or hypovolemia.  Continue Bumex, hydrochlorothiazide, metoprolol.  Monitor BMP.  Cardiology follow-up    Hypothyroidism  TSH most recently 0.56  Plan: Continue levothyroxine, routine lab monitoring    Chronic depression, anxiety in the setting of chronic pain  Currently appears stable  Plan: Continue clonazepam, Abilify, BuSpar.  Psychiatry follow-up    Recent norovirus infection, clinically resolved.  Patient tends toward constipation review of chronic pain, chronic opioids, immobility  Plan: Bowel regimen, monitor for constipation    Chronic left knee pain  Patient requests relieved which has been helpful in which she has tolerated as an outpatient  Plan: Start as needed Naprosyn 550 mg twice daily twice daily.       Romain Bartholomew MD

## 2025-03-01 ENCOUNTER — TRANSITIONAL CARE UNIT VISIT (OUTPATIENT)
Dept: GERIATRICS | Facility: CLINIC | Age: 65
End: 2025-03-01
Payer: COMMERCIAL

## 2025-03-01 DIAGNOSIS — I10 BENIGN ESSENTIAL HYPERTENSION: ICD-10-CM

## 2025-03-01 DIAGNOSIS — J18.9 COMMUNITY ACQUIRED PNEUMONIA OF LEFT LOWER LOBE OF LUNG: ICD-10-CM

## 2025-03-01 DIAGNOSIS — I50.30 HEART FAILURE WITH PRESERVED EJECTION FRACTION, NYHA CLASS I (H): ICD-10-CM

## 2025-03-01 DIAGNOSIS — R29.898 RIGHT LEG WEAKNESS: Primary | ICD-10-CM

## 2025-03-01 DIAGNOSIS — Z98.1 HX OF SPINAL FUSION: ICD-10-CM

## 2025-03-01 PROCEDURE — 99305 1ST NF CARE MODERATE MDM 35: CPT | Performed by: INTERNAL MEDICINE

## 2025-03-01 RX ORDER — NAPROXEN SODIUM 220 MG/1
550 TABLET, FILM COATED ORAL 2 TIMES DAILY PRN
COMMUNITY
Start: 2025-03-01

## 2025-03-01 RX ORDER — HYDROCHLOROTHIAZIDE 25 MG/1
25 TABLET ORAL DAILY
COMMUNITY
Start: 2025-03-01

## 2025-03-02 ENCOUNTER — TELEPHONE (OUTPATIENT)
Dept: GERIATRICS | Facility: CLINIC | Age: 65
End: 2025-03-02
Payer: COMMERCIAL

## 2025-03-02 NOTE — TELEPHONE ENCOUNTER
[facility]IATRIC [facility] TELEPHONE ENCOUNTER    Chief Complaint   Patient presents with    Patient Request       Lisbet Sanchez is a 64 year old  (1960),Nurse called today to report: Patient reporting no sensation to RLE since yesterday. Unable to ambulate. Has been staying in bed all day. Unable to feel any sensation when nurses assesses.     ASSESSMENT/PLAN    Send to ED for further evaluation needs    Electronically signed by:   MELODY Schreiber CNP

## 2025-03-05 ENCOUNTER — DOCUMENTATION ONLY (OUTPATIENT)
Dept: GERIATRICS | Facility: CLINIC | Age: 65
End: 2025-03-05
Payer: COMMERCIAL

## 2025-03-05 DIAGNOSIS — Z98.1 HX OF SPINAL FUSION: ICD-10-CM

## 2025-03-05 DIAGNOSIS — I50.30 HEART FAILURE WITH PRESERVED EJECTION FRACTION, NYHA CLASS I (H): ICD-10-CM

## 2025-03-05 DIAGNOSIS — R29.898 RIGHT LEG WEAKNESS: Primary | ICD-10-CM

## 2025-03-05 NOTE — PROGRESS NOTES
[facility] Geriatrics   2025     Name: Lisbet ERAZO Sanchez   : 1960     Orders:  - Check CBC, CMP, vitamin D, iron level, iron saturation, ferritin level on 3/10/25 dx RLE weakness, anemia, CHF      Electronically signed by  MELODY Jacobsen CNP on 3/5/2025 at 1:48 PM

## 2025-03-06 VITALS
RESPIRATION RATE: 16 BRPM | HEART RATE: 61 BPM | WEIGHT: 207 LBS | BODY MASS INDEX: 35.34 KG/M2 | SYSTOLIC BLOOD PRESSURE: 126 MMHG | OXYGEN SATURATION: 94 % | HEIGHT: 64 IN | TEMPERATURE: 98 F | DIASTOLIC BLOOD PRESSURE: 62 MMHG

## 2025-03-06 RX ORDER — SALIVA STIMULANT COMB. NO.3
15 SPRAY, NON-AEROSOL (ML) MUCOUS MEMBRANE EVERY 4 HOURS PRN
COMMUNITY

## 2025-03-06 RX ORDER — OXYCODONE HYDROCHLORIDE 15 MG/1
15 TABLET ORAL EVERY 4 HOURS PRN
COMMUNITY

## 2025-03-06 NOTE — PROGRESS NOTES
[facility] GERIATRIC [facility]  PRIMARY CARE PROVIDER AND CLINIC:  Aracely Nguyen MD, [address] / [place] [address]  Chief Complaint   Patient presents with    Hospital F/U     [place] Medical Record Number:  [id number]  Place of Service where encounter took place:  [facility] ([facility]) [[id number]]    Lisbet Sanchez  is a 64 year old  (1960), returned to the above facility from  [facility] . Hospital stay 3/2/25 - 3/4/25. .  Admitted to this facility for  {FGS ADMISSION REASONS:359949}.    HPI:    HPI information obtained from: {FGS HPI:083245}.   Brief Summary of Hospital Course: ***  Updates on Status Since Skilled nursing Admission: ***    CODE STATUS/ADVANCE DIRECTIVES DISCUSSION:   CPR/Full code   Patient's living condition: lives with spouse  ALLERGIES: Cephalexin, Cephalosporins, and Vancomycin  PAST MEDICAL HISTORY:  has no past medical history on file.  PAST SURGICAL HISTORY:   has a past surgical history that includes IR Lumbar Puncture (10/31/2018).  FAMILY HISTORY: family history is not on file.  SOCIAL HISTORY:   reports that she has never smoked. She has never used smokeless tobacco. She reports current alcohol use. She reports that she does not use drugs.    Post Discharge Medication Reconciliation Status: {ACO Med Rec (Provider):122704}  ***  Current Outpatient Medications   Medication Sig Dispense Refill    artificial saliva (BIOTENE MT) SOLN solution Swish and spit 15 mLs in mouth every 4 hours as needed for dry mouth.      oxyCODONE IR (ROXICODONE) 15 MG tablet Take 15 mg by mouth every 4 hours as needed for severe pain.      acetaminophen (TYLENOL) 325 MG tablet Take 650 mg by mouth every 6 hours as needed for mild pain.      ARIPiprazole (ABILIFY) 30 MG tablet Take 30 mg by mouth daily.      atorvastatin (LIPITOR) 10 MG tablet Take 10 mg by mouth daily.      bumetanide (BUMEX) 0.5 MG tablet Take 0.5 mg by mouth 2 times daily.      busPIRone  (BUSPAR) 10 MG tablet Take 20 mg by mouth 2 times daily.      Calcium Carb-Cholecalciferol (CALCIUM 500 + D) 500-5 MG-MCG TABS Take 1 tablet by mouth daily.      clonazePAM (KLONOPIN) 0.5 MG tablet Take 1 tablet (0.5 mg) by mouth 3 times daily as needed for anxiety. 30 tablet 1    Cranberry 450 MG TABS Take 450 mg by mouth daily.      desvenlafaxine (PRISTIQ) 100 MG 24 hr tablet Take 100 mg by mouth daily.      eletriptan (RELPAX) 20 MG tablet Take 20 mg by mouth 2 times daily as needed for migraine.      gabapentin (GRALISE) 600 MG 24 hr tablet Take 600 mg by mouth 4 times daily.      hydroCHLOROthiazide (HYDRODIURIL) 25 MG tablet Take 1 tablet (25 mg) by mouth daily.      lactulose (CHRONULAC) 10 GM/15ML solution Take 10 g by mouth 2 times daily.      levothyroxine (SYNTHROID/LEVOTHROID) 175 MCG tablet Take 175 mcg by mouth daily.      Lidocaine (LIDOCARE) 4 % Patch Place 1 patch onto the skin every 24 hours. To prevent lidocaine toxicity, patient should be patch free for 12 hrs daily.      lidocaine (LMX4) 4 % external cream Apply topically 3 times daily. Apply to low back      methocarbamol (ROBAXIN) 500 MG tablet Take 500 mg by mouth every 6 hours as needed for muscle spasms.      metoprolol succinate ER (TOPROL XL) 25 MG 24 hr tablet Take 25 mg by mouth daily.      multivitamin (CENTRUM SILVER) tablet Take 1 tablet by mouth daily.      naloxone (NARCAN) 4 MG/0.1ML nasal spray Spray 4 mg into one nostril alternating nostrils as needed for opioid reversal. every 2-3 minutes until assistance arrives      naproxen sodium (ANAPROX) 220 MG tablet Take 3 tablets (660 mg) by mouth 2 times daily as needed for moderate pain.      ondansetron (ZOFRAN ODT) 4 MG ODT tab Take 4 mg by mouth every 8 hours as needed for nausea.      oxyBUTYnin (DITROPAN) 5 MG tablet Take 5 mg by mouth daily.      oxyCODONE (ROXICODONE) 5 MG tablet Take 3 tablets (15 mg) by mouth 4 times daily. May also take 3 tablets (15 mg) nightly as needed  "for pain. 90 tablet 0    polyethylene glycol (MIRALAX) 17 g packet Take 17 g by mouth daily.      pseudoePHEDrine (SUDAFED) 60 MG tablet Take 120 mg by mouth daily as needed.      senna-docusate (SENOKOT-S/PERICOLACE) 8.6-50 MG tablet Take 2 tablets by mouth 2 times daily as needed.       {Providers Please double check the med list (in the plan section >> meds & orders tab) and Discontinue any of the meds flagged by the TC to be discontinued}  ***  ROS:  {ROS FGS:822452}    Vitals:  /62   Pulse 61   Temp 98  F (36.7  C)   Resp 16   Ht 1.626 m (5' 4\")   Wt 93.9 kg (207 lb)   SpO2 94%   BMI 35.53 kg/m    Exam:  {Nursing home physical exam :981754}    Lab/Diagnostic data:  {fgslab:435842}    ASSESSMENT/PLAN:  {FGS DX INITIAL:982901}    {fgsorders:425613}  ***    Total time spent with patient visit at the skilled nursing facility was {1/2/3/4/5:045434} including {1/2/3/4/5:645368}. Greater than 50% of total time spent with counseling and coordinating care due to ***.     Electronically signed by:  Natividad Woodson ***  {Providers Please double check the med list (in the plan section >> meds & orders tab) and Discontinue any of the meds flagged by the TC to be discontinued}                  "

## 2025-03-07 ENCOUNTER — LAB REQUISITION (OUTPATIENT)
Dept: LAB | Facility: CLINIC | Age: 65
End: 2025-03-07
Payer: COMMERCIAL

## 2025-03-07 ENCOUNTER — TRANSITIONAL CARE UNIT VISIT (OUTPATIENT)
Dept: GERIATRICS | Facility: CLINIC | Age: 65
End: 2025-03-07
Payer: COMMERCIAL

## 2025-03-07 DIAGNOSIS — I50.9 HEART FAILURE, UNSPECIFIED (H): ICD-10-CM

## 2025-03-07 DIAGNOSIS — D64.9 ANEMIA, UNSPECIFIED: ICD-10-CM

## 2025-03-07 DIAGNOSIS — R53.1 WEAKNESS: ICD-10-CM

## 2025-03-10 ENCOUNTER — TRANSITIONAL CARE UNIT VISIT (OUTPATIENT)
Dept: GERIATRICS | Facility: CLINIC | Age: 65
End: 2025-03-10
Payer: COMMERCIAL

## 2025-03-10 VITALS
TEMPERATURE: 97.8 F | HEIGHT: 64 IN | BODY MASS INDEX: 35.24 KG/M2 | HEART RATE: 56 BPM | RESPIRATION RATE: 18 BRPM | SYSTOLIC BLOOD PRESSURE: 119 MMHG | OXYGEN SATURATION: 92 % | DIASTOLIC BLOOD PRESSURE: 61 MMHG | WEIGHT: 206.4 LBS

## 2025-03-10 DIAGNOSIS — Z98.1 HX OF SPINAL FUSION: Primary | ICD-10-CM

## 2025-03-10 DIAGNOSIS — F11.90 CHRONIC, CONTINUOUS USE OF OPIOIDS: ICD-10-CM

## 2025-03-10 LAB
ALBUMIN SERPL BCG-MCNC: 3.7 G/DL (ref 3.5–5.2)
ALP SERPL-CCNC: 93 U/L (ref 40–150)
ALT SERPL W P-5'-P-CCNC: 21 U/L (ref 0–50)
ANION GAP SERPL CALCULATED.3IONS-SCNC: 13 MMOL/L (ref 7–15)
AST SERPL W P-5'-P-CCNC: 23 U/L (ref 0–45)
BILIRUB SERPL-MCNC: 0.2 MG/DL
BUN SERPL-MCNC: 11.1 MG/DL (ref 8–23)
CALCIUM SERPL-MCNC: 9.3 MG/DL (ref 8.8–10.4)
CHLORIDE SERPL-SCNC: 102 MMOL/L (ref 98–107)
CREAT SERPL-MCNC: 0.75 MG/DL (ref 0.51–0.95)
EGFRCR SERPLBLD CKD-EPI 2021: 88 ML/MIN/1.73M2
ERYTHROCYTE [DISTWIDTH] IN BLOOD BY AUTOMATED COUNT: 14.1 % (ref 10–15)
FERRITIN SERPL-MCNC: 42 NG/ML (ref 11–328)
GLUCOSE SERPL-MCNC: 92 MG/DL (ref 70–99)
HCO3 SERPL-SCNC: 31 MMOL/L (ref 22–29)
HCT VFR BLD AUTO: 33.6 % (ref 35–47)
HGB BLD-MCNC: 10.7 G/DL (ref 11.7–15.7)
IRON BINDING CAPACITY (ROCHE): 293 UG/DL (ref 240–430)
IRON SATN MFR SERPL: 15 % (ref 15–46)
IRON SERPL-MCNC: 43 UG/DL (ref 37–145)
MCH RBC QN AUTO: 26.8 PG (ref 26.5–33)
MCHC RBC AUTO-ENTMCNC: 31.8 G/DL (ref 31.5–36.5)
MCV RBC AUTO: 84 FL (ref 78–100)
PLATELET # BLD AUTO: 201 10E3/UL (ref 150–450)
POTASSIUM SERPL-SCNC: 3.9 MMOL/L (ref 3.4–5.3)
PROT SERPL-MCNC: 6.2 G/DL (ref 6.4–8.3)
RBC # BLD AUTO: 3.99 10E6/UL (ref 3.8–5.2)
SODIUM SERPL-SCNC: 146 MMOL/L (ref 135–145)
VIT D+METAB SERPL-MCNC: 24 NG/ML (ref 20–50)
WBC # BLD AUTO: 5.4 10E3/UL (ref 4–11)

## 2025-03-10 PROCEDURE — 84155 ASSAY OF PROTEIN SERUM: CPT | Performed by: NURSE PRACTITIONER

## 2025-03-10 PROCEDURE — P9604 ONE-WAY ALLOW PRORATED TRIP: HCPCS | Performed by: NURSE PRACTITIONER

## 2025-03-10 PROCEDURE — 83550 IRON BINDING TEST: CPT | Performed by: NURSE PRACTITIONER

## 2025-03-10 PROCEDURE — 85027 COMPLETE CBC AUTOMATED: CPT | Performed by: NURSE PRACTITIONER

## 2025-03-10 PROCEDURE — 83540 ASSAY OF IRON: CPT | Performed by: NURSE PRACTITIONER

## 2025-03-10 PROCEDURE — 82947 ASSAY GLUCOSE BLOOD QUANT: CPT | Performed by: NURSE PRACTITIONER

## 2025-03-10 PROCEDURE — 82306 VITAMIN D 25 HYDROXY: CPT | Performed by: NURSE PRACTITIONER

## 2025-03-10 PROCEDURE — 36415 COLL VENOUS BLD VENIPUNCTURE: CPT | Performed by: NURSE PRACTITIONER

## 2025-03-10 PROCEDURE — 84132 ASSAY OF SERUM POTASSIUM: CPT | Performed by: NURSE PRACTITIONER

## 2025-03-10 PROCEDURE — 82728 ASSAY OF FERRITIN: CPT | Performed by: NURSE PRACTITIONER

## 2025-03-10 PROCEDURE — 99310 SBSQ NF CARE HIGH MDM 45: CPT | Performed by: NURSE PRACTITIONER

## 2025-03-10 RX ORDER — OXYCODONE HYDROCHLORIDE 15 MG/1
TABLET ORAL
Qty: 60 TABLET | Refills: 0 | Status: SHIPPED | OUTPATIENT
Start: 2025-03-10

## 2025-03-10 NOTE — PATIENT INSTRUCTIONS
[facility] Geriatrics   March 10, 2025     Name: Lisbet Sanchez   : 1960       Orders:  - Discontinue oxycodone PRN  - Add oxycodone 15mg every 4hrs (8am, 12pm, 4pm, 8pm) dx chronic pain  - Add oxycodone 15mg at bedtime PRN dx chronic pain  - Assist patient with dinning room transports   - Follow up with Dr. Garcia at [facility]  - Encourage 8oz fluid TID between meals  - Check BMP on 3/13/25 dx hypernatremia, decreased appetite       Electronically signed by   MELODY Jacobsen CNP on 3/10/2025 at 2:23 PM

## 2025-03-10 NOTE — PROGRESS NOTES
"[facility] GERIATRICS    Chief Complaint   Patient presents with    RECHECK     HPI:  Lisbet Sanchez is a 64 year old  (1960), who is being seen today for an episodic care visit at: [facility] ([facility]) [[id number]].       Today's concern is:     During exam, patient seen resting in bed with Mary Alice (daughter) present via phone.  5 stairs at home, hasn't completed stairs with therapy yet. Endorses burning/numbness and weakness to RLE.   Admits to poor intake w/meals. Reports has been drinking fluids.         Allergies, and PMH/PSH reviewed in [facility] today.    REVIEW OF SYSTEMS:  {lwfbyt29:208455}      Objective:   /61   Pulse 56   Temp 97.8  F (36.6  C)   Resp 18   Ht 1.626 m (5' 4\")   Wt 93.6 kg (206 lb 6.4 oz)   SpO2 92%   BMI 35.43 kg/m    {Nursing home physical exam :198461}    Wt Readings from Last 4 Encounters:   03/10/25 93.6 kg (206 lb 6.4 oz)   03/06/25 93.9 kg (207 lb)   02/28/25 94 kg (207 lb 3.2 oz)   02/24/25 93.3 kg (205 lb 9.6 oz)       Recent labs in [facility] reviewed by me today.   Most Recent 3 CBC's:  Recent Labs   Lab Test 03/10/25  0644 02/21/25  1107 02/18/25  0524   WBC 5.4 4.8 7.9   HGB 10.7* 11.5* 11.7   MCV 84 84 86    225 230     Most Recent 3 BMP's:  Recent Labs   Lab Test 03/10/25  0644 02/27/25  0651 02/24/25  1146   * 141 144   POTASSIUM 3.9 4.0 4.0   CHLORIDE 102 102 102   CO2 31* 30* 31*   BUN 11.1 10.8 8.8   CR 0.75 0.74 0.63   ANIONGAP 13 9 11   MARIELY 9.3 9.0 9.4   GLC 92 105* 118*     Most Recent 2 LFT's:  Recent Labs   Lab Test 03/10/25  0644 02/18/25  0524   AST 23 65*   ALT 21 46   ALKPHOS 93 122   BILITOTAL 0.2 0.4     TSH   Date Value Ref Range Status   02/21/2025 0.56 0.30 - 4.20 uIU/mL Final         Assessment/Plan:    {FGS DX2:254889}          Orders:  - Discontinue oxycodone PRN  - Add oxycodone 15mg every 4hrs (8am, 12pm, 4pm, 8pm) dx chronic pain  - Add oxycodone 15mg at bedtime PRN dx chronic pain  - Assist patient " with dinning room transports   - Follow up with Dr. Garcia at [facility]  - Encourage 8oz fluid TID between meals  - Check BMP on 3/13/25 dx hypernatremia, decreased appetite         1330     Total time spent during today's visit was *** mins including patient visit and review of past records. Time also spent coordinating care with ***.     Electronically signed by: MELODY Jacobsen CNP        directed, next refill for pain pump in April     (R13.10) Dysphagia, unspecified type  Hypernatremia   Comment: Chronic dysphagia; patient reported difficulty swallowing since 01/2024. Acute hypernatremia r/t decreased intake.  Plan:   - Encourage 8oz fluid TID between meals  - Check BMP on 3/13/25 dx hypernatremia, decreased appetite   - ST following      Orders:  - Discontinue oxycodone PRN  - Add oxycodone 15mg every 4hrs (8am, 12pm, 4pm, 8pm) dx chronic pain  - Add oxycodone 15mg at bedtime PRN dx chronic pain  - Assist patient with dinning room transports   - Follow up with Dr. Garcia at [facility]  - Encourage 8oz fluid TID between meals  - Check BMP on 3/13/25 dx hypernatremia, decreased appetite         Total time spent during today's visit was 45 mins including patient visit and review of past records.     Electronically signed by: MELODY Jacobsen CNP

## 2025-03-12 ENCOUNTER — LAB REQUISITION (OUTPATIENT)
Dept: LAB | Facility: CLINIC | Age: 65
End: 2025-03-12
Payer: COMMERCIAL

## 2025-03-12 VITALS
BODY MASS INDEX: 35.24 KG/M2 | DIASTOLIC BLOOD PRESSURE: 58 MMHG | WEIGHT: 206.4 LBS | HEART RATE: 54 BPM | SYSTOLIC BLOOD PRESSURE: 124 MMHG | RESPIRATION RATE: 18 BRPM | HEIGHT: 64 IN | OXYGEN SATURATION: 91 % | TEMPERATURE: 97.7 F

## 2025-03-12 DIAGNOSIS — E63.9 NUTRITIONAL DEFICIENCY, UNSPECIFIED: ICD-10-CM

## 2025-03-12 DIAGNOSIS — E87.0 HYPEROSMOLALITY AND HYPERNATREMIA: ICD-10-CM

## 2025-03-12 NOTE — PROGRESS NOTES
[facility] GERIATRICS DISCHARGE SUMMARY  PATIENT'S NAME: Lisbet Sanchez  YOB: 1960  MEDICAL RECORD NUMBER:  [id number]  Place of Service where encounter took place:  [facility] ([facility]) [[id number]]    PRIMARY CARE PROVIDER AND CLINIC RESPONSIBLE AFTER TRANSFER:   Aracely Nguyen MD, [address]  / [place] [address]    Non-G Provider     Transferring providers: MELODY Jacobsen CNP; Romain Bartholomew MD  Recent Hospitalization/ED:  [facility] stay 2/7/25 to 2/11/25.  Date of SNF Admission:  2/11/25  Date of SNF (anticipated) Discharge:  3/15/25  Discharged to: previous independent home  Cognitive Scores: {fgscog1:011296}  Physical Function: {fgsphysicalfunction:793584}  DME: {fgsdmedc:227470}    CODE STATUS/ADVANCE DIRECTIVES DISCUSSION:  Full Code   ALLERGIES: Cephalexin, Cephalosporins, and Vancomycin      NURSING FACILITY COURSE     Medication Changes/Rationale:   ***      Summary of nursing facility stay:     {FGS DX2:960581}        Discharge Medications:    MED REC REQUIRED  Post Medication Reconciliation Status: discharge medications reconciled and changed, per note/orders     Current Outpatient Medications   Medication Sig Dispense Refill    ARIPiprazole (ABILIFY) 30 MG tablet Take 30 mg by mouth daily.      artificial saliva (BIOTENE MT) SOLN solution Swish and spit 15 mLs in mouth every 4 hours as needed for dry mouth.      atorvastatin (LIPITOR) 10 MG tablet Take 10 mg by mouth daily.      bumetanide (BUMEX) 0.5 MG tablet Take 0.5 mg by mouth 2 times daily.      busPIRone (BUSPAR) 10 MG tablet Take 20 mg by mouth 2 times daily.      Calcium Carb-Cholecalciferol (CALCIUM 500 + D) 500-5 MG-MCG TABS Take 1 tablet by mouth daily.      clonazePAM (KLONOPIN) 0.5 MG tablet Take 1 tablet (0.5 mg) by mouth 3 times daily as needed for anxiety. 30 tablet 1    Cranberry 450 MG TABS Take 450 mg by mouth daily.       "desvenlafaxine (PRISTIQ) 100 MG 24 hr tablet Take 100 mg by mouth daily.      eletriptan (RELPAX) 20 MG tablet Take 20 mg by mouth 2 times daily as needed for migraine.      gabapentin (GRALISE) 600 MG 24 hr tablet Take 600 mg by mouth 4 times daily.      lactulose (CHRONULAC) 10 GM/15ML solution Take 10 g by mouth 2 times daily.      levothyroxine (SYNTHROID/LEVOTHROID) 175 MCG tablet Take 175 mcg by mouth daily.      Lidocaine (LIDOCARE) 4 % Patch Place 2 patches onto the skin every 24 hours. To prevent lidocaine toxicity, patient should be patch free for 12 hrs daily.      lidocaine (LMX4) 4 % external cream Apply topically 3 times daily. Apply to low back      methocarbamol (ROBAXIN) 500 MG tablet Take 500 mg by mouth every 6 hours as needed for muscle spasms.      metoprolol succinate ER (TOPROL XL) 25 MG 24 hr tablet Take 25 mg by mouth daily.      multivitamin (CENTRUM SILVER) tablet Take 1 tablet by mouth daily.      naloxone (NARCAN) 4 MG/0.1ML nasal spray Spray 4 mg into one nostril alternating nostrils as needed for opioid reversal. every 2-3 minutes until assistance arrives      ondansetron (ZOFRAN ODT) 4 MG ODT tab Take 4 mg by mouth every 8 hours as needed for nausea.      oxyBUTYnin (DITROPAN) 5 MG tablet Take 5 mg by mouth daily.      oxyCODONE IR (ROXICODONE) 15 MG tablet Take 1 tablet (15 mg) by mouth 4 times daily. May also take 1 tablet (15 mg) nightly as needed for severe pain. 60 tablet 0    polyethylene glycol (MIRALAX) 17 g packet Take 17 g by mouth daily.      pseudoePHEDrine (SUDAFED) 60 MG tablet Take 120 mg by mouth daily as needed.      senna-docusate (SENOKOT-S/PERICOLACE) 8.6-50 MG tablet Take 2 tablets by mouth 2 times daily as needed.          Controlled medications:   {fgscontrolledmeds1:062767}       Past Medical History: No past medical history on file.  Physical Exam:   Vitals: /58   Pulse 54   Temp 97.7  F (36.5  C)   Resp 18   Ht 1.626 m (5' 4\")   Wt 93.6 kg (206 lb " 6.4 oz)   SpO2 (!) 91%   BMI 35.43 kg/m    BMI: Body mass index is 35.43 kg/m .  {NURSING HOME PHYSICAL EXAM:173196}       SNF labs: Recent labs in [facility] reviewed by me today  Most Recent 3 CBC's:  Recent Labs   Lab Test 03/10/25  0644 02/21/25  1107 02/18/25  0524   WBC 5.4 4.8 7.9   HGB 10.7* 11.5* 11.7   MCV 84 84 86    225 230     Most Recent 3 BMP's:  Recent Labs   Lab Test 03/13/25  0800 03/10/25  0644 02/27/25  0651    146* 141   POTASSIUM 4.4 3.9 4.0   CHLORIDE 100 102 102   CO2 30* 31* 30*   BUN 11.4 11.1 10.8   CR 0.73 0.75 0.74   ANIONGAP 9 13 9   MARIELY 9.7 9.3 9.0   GLC 94 92 105*     Ferritin   Date Value Ref Range Status   03/10/2025 42 11 - 328 ng/mL Final     Iron   Date Value Ref Range Status   03/10/2025 43 37 - 145 ug/dL Final     Iron Binding Capacity   Date Value Ref Range Status   03/10/2025 293 240 - 430 ug/dL Final         DISCHARGE PLAN:    Follow up labs: No labs orders/due      Medical Follow Up:      {fgsdischargefollowup:940205}      Discharge Services: {fgsdcservices1:834141}      Discharge Instructions Verbalized to Patient at Discharge:   {fgsdischargeinstructions1:431055}        TOTAL DISCHARGE TIME:   Greater than 30 minutes    Electronically signed by:  MELODY Jacobsen CNP          "(TOPROL XL) 25 MG 24 hr tablet Take 25 mg by mouth daily.      multivitamin (CENTRUM SILVER) tablet Take 1 tablet by mouth daily.      naloxone (NARCAN) 4 MG/0.1ML nasal spray Spray 4 mg into one nostril alternating nostrils as needed for opioid reversal. every 2-3 minutes until assistance arrives      ondansetron (ZOFRAN ODT) 4 MG ODT tab Take 4 mg by mouth every 8 hours as needed for nausea.      oxyBUTYnin (DITROPAN) 5 MG tablet Take 5 mg by mouth daily.      oxyCODONE IR (ROXICODONE) 15 MG tablet Take 1 tablet (15 mg) by mouth every 4 hours as needed for severe pain.      polyethylene glycol (MIRALAX) 17 g packet Take 17 g by mouth daily.      pseudoePHEDrine (SUDAFED) 60 MG tablet Take 120 mg by mouth daily as needed.      senna-docusate (SENOKOT-S/PERICOLACE) 8.6-50 MG tablet Take 2 tablets by mouth 2 times daily as needed.          Controlled medications:   Medication: oxycodone 15mg , 40 tabs and clonazepam 0.5mg, 20 tabs given to patient at the time of discharge to take home       Past Medical History: No past medical history on file.  Physical Exam:   Vitals: /58   Pulse 54   Temp 97.7  F (36.5  C)   Resp 18   Ht 1.626 m (5' 4\")   Wt 93.6 kg (206 lb 6.4 oz)   SpO2 (!) 91%   BMI 35.43 kg/m    BMI: Body mass index is 35.43 kg/m .  GENERAL APPEARANCE:  Alert, in no distress, appears healthy, oriented, cooperative  RESP:  respiratory effort and palpation of chest normal, lungs clear to auscultation , no respiratory distress  CV:  regular rate and rhythm, no murmur  M/S:   Gait and station abnormal , resting in bed.   SKIN:  Inspection of skin and subcutaneous tissue baseline, Palpation of skin and subcutaneous tissue baseline  NEURO:   Cranial nerves 2-12 are normal tested and grossly at patient's baseline, Examination of sensation by touch normal  PSYCH:  oriented X 3, affect and mood normal       SNF labs: Recent labs in [facility] reviewed by me today  Most Recent 3 CBC's:  Recent Labs   Lab Test " 03/10/25  0644 02/21/25  1107 02/18/25  0524   WBC 5.4 4.8 7.9   HGB 10.7* 11.5* 11.7   MCV 84 84 86    225 230     Most Recent 3 BMP's:  Recent Labs   Lab Test 03/13/25  0800 03/10/25  0644 02/27/25  0651    146* 141   POTASSIUM 4.4 3.9 4.0   CHLORIDE 100 102 102   CO2 30* 31* 30*   BUN 11.4 11.1 10.8   CR 0.73 0.75 0.74   ANIONGAP 9 13 9   MARIELY 9.7 9.3 9.0   GLC 94 92 105*     Ferritin   Date Value Ref Range Status   03/10/2025 42 11 - 328 ng/mL Final     Iron   Date Value Ref Range Status   03/10/2025 43 37 - 145 ug/dL Final     Iron Binding Capacity   Date Value Ref Range Status   03/10/2025 293 240 - 430 ug/dL Final         DISCHARGE PLAN:    Follow up labs: No labs orders/due      Medical Follow Up:      Follow up with primary care provider in 2 weeks  Follow up with specialist - [facility] Neurology  Referral to GI due to ongoing dysphagia       Discharge Services: Home Care:  Occupational Therapy, Physical Therapy, Registered Nurse, Home Health Aide, and From:  [facility]      Discharge Instructions Verbalized to Patient at Discharge:   Notify PCP if you have a fever greater than 100.5 degrees.   DO NOT DRIVE while taking narcotic pain medications.   Driving is not recommended until cleared by PCP to resume.         TOTAL DISCHARGE TIME:   Greater than 30 minutes    Electronically signed by:  MELODY Jcaobsen CNP

## 2025-03-13 ENCOUNTER — DISCHARGE SUMMARY NURSING HOME (OUTPATIENT)
Dept: GERIATRICS | Facility: CLINIC | Age: 65
End: 2025-03-13
Payer: COMMERCIAL

## 2025-03-13 DIAGNOSIS — R29.898 RIGHT LEG WEAKNESS: Primary | ICD-10-CM

## 2025-03-13 DIAGNOSIS — K59.09 CHRONIC CONSTIPATION: ICD-10-CM

## 2025-03-13 DIAGNOSIS — N32.81 OVERACTIVE BLADDER: ICD-10-CM

## 2025-03-13 DIAGNOSIS — Z98.890 S/P INSERTION OF INTRATHECAL PUMP: ICD-10-CM

## 2025-03-13 DIAGNOSIS — I10 BENIGN ESSENTIAL HYPERTENSION: ICD-10-CM

## 2025-03-13 DIAGNOSIS — R53.81 PHYSICAL DECONDITIONING: ICD-10-CM

## 2025-03-13 DIAGNOSIS — E03.9 HYPOTHYROIDISM, UNSPECIFIED TYPE: ICD-10-CM

## 2025-03-13 DIAGNOSIS — Z98.1 HX OF SPINAL FUSION: ICD-10-CM

## 2025-03-13 DIAGNOSIS — G47.33 OSA (OBSTRUCTIVE SLEEP APNEA): ICD-10-CM

## 2025-03-13 DIAGNOSIS — I50.30 HEART FAILURE WITH PRESERVED EJECTION FRACTION, NYHA CLASS I (H): ICD-10-CM

## 2025-03-13 DIAGNOSIS — R13.10 DYSPHAGIA, UNSPECIFIED TYPE: ICD-10-CM

## 2025-03-13 DIAGNOSIS — F11.90 CHRONIC, CONTINUOUS USE OF OPIOIDS: ICD-10-CM

## 2025-03-13 LAB
ANION GAP SERPL CALCULATED.3IONS-SCNC: 9 MMOL/L (ref 7–15)
BUN SERPL-MCNC: 11.4 MG/DL (ref 8–23)
CALCIUM SERPL-MCNC: 9.7 MG/DL (ref 8.8–10.4)
CHLORIDE SERPL-SCNC: 100 MMOL/L (ref 98–107)
CREAT SERPL-MCNC: 0.73 MG/DL (ref 0.51–0.95)
EGFRCR SERPLBLD CKD-EPI 2021: >90 ML/MIN/1.73M2
GLUCOSE SERPL-MCNC: 94 MG/DL (ref 70–99)
HCO3 SERPL-SCNC: 30 MMOL/L (ref 22–29)
POTASSIUM SERPL-SCNC: 4.4 MMOL/L (ref 3.4–5.3)
SODIUM SERPL-SCNC: 139 MMOL/L (ref 135–145)

## 2025-03-13 PROCEDURE — 36415 COLL VENOUS BLD VENIPUNCTURE: CPT | Performed by: NURSE PRACTITIONER

## 2025-03-13 PROCEDURE — P9604 ONE-WAY ALLOW PRORATED TRIP: HCPCS | Performed by: NURSE PRACTITIONER

## 2025-03-13 PROCEDURE — 99316 NF DSCHRG MGMT 30 MIN+: CPT | Performed by: NURSE PRACTITIONER

## 2025-03-13 PROCEDURE — 80048 BASIC METABOLIC PNL TOTAL CA: CPT | Performed by: NURSE PRACTITIONER

## 2025-03-13 NOTE — LETTER
" 3/13/2025      Lisbet Sanchez  [address] [place] [id number]        [facility] GERIATRICS    Chief Complaint   Patient presents with    RECHECK     HPI:  Lisbet Sanchez is a 64 year old  (1960), who is being seen today for an episodic care visit at: [facility] ([facility]) [[id number]]. Today's concern is: ***    Allergies, and PMH/PSH reviewed in [facility] today.  REVIEW OF SYSTEMS:  {wkjjyc56:311410}    Objective:   /58   Pulse 54   Temp 97.7  F (36.5  C)   Resp 18   Ht 1.626 m (5' 4\")   Wt 93.6 kg (206 lb 6.4 oz)   SpO2 (!) 91%   BMI 35.43 kg/m    {Nursing home physical exam :110673}    {fgslab:596194}    Assessment/Plan:  {FGS DX2:934097}    MED REC REQUIRED{TIP  Click the link below to document or use med rec list, use list to pull in response :437406}  Post Medication Reconciliation Status: {MED REC LIST:939269}      Orders:  {fgsorders:791875}  ***    Electronically signed by: Natividad Woodson ***          Sincerely,        MELODY Jacobsen CNP    Electronically signed  "

## 2025-03-14 RX ORDER — OXYCODONE HYDROCHLORIDE 15 MG/1
15 TABLET ORAL EVERY 4 HOURS PRN
COMMUNITY
Start: 2025-03-14

## 2025-03-15 NOTE — PROGRESS NOTES
[facility] Discharge Orders    Name: Lisbet Sanchez  : 1960  Planned Discharge Date: 3/15/25  Discharged to: previous independent home      MEDICAL FOLLOW UP              Follow up with primary care provider in 2 weeks  Follow up with specialist - [facility]  Referral to GI due to ongoing dysphagia       FUTURE LABS: No labs orders/due    ORDER CHANGES:  - Change oxycodone back to 15mg every 4hrs PRN dx pain upon discharge     DISCHARGE MEDICATIONS:  The patient s pharmacy is authorized to dispense a 30-day supply of medications. Refill requests should be directed to the primary provider, Aracely Nguyen.   Controlled medications:   Medication: oxycodone 15mg , 40 tabs and clonazepam 0.5mg, 20 tabs given to patient at the time of discharge to take home     Current Outpatient Medications   Medication Sig Dispense Refill    ARIPiprazole (ABILIFY) 30 MG tablet Take 30 mg by mouth daily.      artificial saliva (BIOTENE MT) SOLN solution Swish and spit 15 mLs in mouth every 4 hours as needed for dry mouth.      atorvastatin (LIPITOR) 10 MG tablet Take 10 mg by mouth daily.      bumetanide (BUMEX) 0.5 MG tablet Take 0.5 mg by mouth 2 times daily.      busPIRone (BUSPAR) 10 MG tablet Take 20 mg by mouth 2 times daily.      Calcium Carb-Cholecalciferol (CALCIUM 500 + D) 500-5 MG-MCG TABS Take 1 tablet by mouth daily.      clonazePAM (KLONOPIN) 0.5 MG tablet Take 1 tablet (0.5 mg) by mouth 3 times daily as needed for anxiety. 30 tablet 1    Cranberry 450 MG TABS Take 450 mg by mouth daily.      desvenlafaxine (PRISTIQ) 100 MG 24 hr tablet Take 100 mg by mouth daily.      eletriptan (RELPAX) 20 MG tablet Take 20 mg by mouth 2 times daily as needed for migraine.      gabapentin (GRALISE) 600 MG 24 hr tablet Take 600 mg by mouth 4 times daily.      lactulose (CHRONULAC) 10 GM/15ML solution Take 10 g by mouth 2 times daily.      levothyroxine (SYNTHROID/LEVOTHROID) 175 MCG  tablet Take 175 mcg by mouth daily.      Lidocaine (LIDOCARE) 4 % Patch Place 2 patches onto the skin every 24 hours. To prevent lidocaine toxicity, patient should be patch free for 12 hrs daily.      lidocaine (LMX4) 4 % external cream Apply topically 3 times daily. Apply to low back      methocarbamol (ROBAXIN) 500 MG tablet Take 500 mg by mouth every 6 hours as needed for muscle spasms.      metoprolol succinate ER (TOPROL XL) 25 MG 24 hr tablet Take 25 mg by mouth daily.      multivitamin (CENTRUM SILVER) tablet Take 1 tablet by mouth daily.      naloxone (NARCAN) 4 MG/0.1ML nasal spray Spray 4 mg into one nostril alternating nostrils as needed for opioid reversal. every 2-3 minutes until assistance arrives      ondansetron (ZOFRAN ODT) 4 MG ODT tab Take 4 mg by mouth every 8 hours as needed for nausea.      oxyBUTYnin (DITROPAN) 5 MG tablet Take 5 mg by mouth daily.      oxyCODONE IR (ROXICODONE) 15 MG tablet Take 1 tablet (15 mg) by mouth every 4 hours as needed for severe pain.      polyethylene glycol (MIRALAX) 17 g packet Take 17 g by mouth daily.      pseudoePHEDrine (SUDAFED) 60 MG tablet Take 120 mg by mouth daily as needed.      senna-docusate (SENOKOT-S/PERICOLACE) 8.6-50 MG tablet Take 2 tablets by mouth 2 times daily as needed.         SERVICES:  Home Care:  Occupational Therapy, Physical Therapy, Registered Nurse, Home Health Aide, and From:  [facility]     ADDITIONAL INSTRUCTIONS:  Notify PCP if you have a fever greater than 100.5 degrees.   DO NOT DRIVE while taking narcotic pain medications.   Driving is not recommended until cleared by PCP to resume.         MELODY Jacobsen CNP  This document was electronically signed on March 14, 2025

## 2025-05-05 ENCOUNTER — DOCUMENTATION ONLY (OUTPATIENT)
Dept: GERIATRICS | Facility: CLINIC | Age: 65
End: 2025-05-05
Payer: COMMERCIAL

## 2025-05-05 ENCOUNTER — LAB REQUISITION (OUTPATIENT)
Dept: LAB | Facility: CLINIC | Age: 65
End: 2025-05-05
Payer: COMMERCIAL

## 2025-05-05 DIAGNOSIS — Z11.1 ENCOUNTER FOR SCREENING FOR RESPIRATORY TUBERCULOSIS: ICD-10-CM

## 2025-05-06 ENCOUNTER — TRANSITIONAL CARE UNIT VISIT (OUTPATIENT)
Dept: GERIATRICS | Facility: CLINIC | Age: 65
End: 2025-05-06
Payer: COMMERCIAL

## 2025-05-06 VITALS
HEART RATE: 59 BPM | SYSTOLIC BLOOD PRESSURE: 154 MMHG | BODY MASS INDEX: 35.55 KG/M2 | DIASTOLIC BLOOD PRESSURE: 78 MMHG | TEMPERATURE: 97 F | WEIGHT: 208.2 LBS | OXYGEN SATURATION: 95 % | RESPIRATION RATE: 18 BRPM | HEIGHT: 64 IN

## 2025-05-06 DIAGNOSIS — I10 BENIGN ESSENTIAL HYPERTENSION: ICD-10-CM

## 2025-05-06 DIAGNOSIS — Z98.1 HX OF SPINAL FUSION: ICD-10-CM

## 2025-05-06 DIAGNOSIS — R29.898 RIGHT LEG WEAKNESS: Primary | ICD-10-CM

## 2025-05-06 DIAGNOSIS — Z98.890 S/P INSERTION OF INTRATHECAL PUMP: ICD-10-CM

## 2025-05-06 DIAGNOSIS — K59.09 CHRONIC CONSTIPATION: ICD-10-CM

## 2025-05-06 DIAGNOSIS — I50.30 HEART FAILURE WITH PRESERVED EJECTION FRACTION, NYHA CLASS I (H): ICD-10-CM

## 2025-05-06 PROCEDURE — 86481 TB AG RESPONSE T-CELL SUSP: CPT | Performed by: NURSE PRACTITIONER

## 2025-05-06 PROCEDURE — P9604 ONE-WAY ALLOW PRORATED TRIP: HCPCS | Performed by: NURSE PRACTITIONER

## 2025-05-06 PROCEDURE — 99305 1ST NF CARE MODERATE MDM 35: CPT | Performed by: INTERNAL MEDICINE

## 2025-05-06 PROCEDURE — 36415 COLL VENOUS BLD VENIPUNCTURE: CPT | Performed by: NURSE PRACTITIONER

## 2025-05-06 RX ORDER — SENNOSIDES 8.6 MG
2 TABLET ORAL 2 TIMES DAILY PRN
COMMUNITY

## 2025-05-06 RX ORDER — CLINDAMYCIN HYDROCHLORIDE 300 MG/1
300 CAPSULE ORAL 3 TIMES DAILY
COMMUNITY

## 2025-05-06 RX ORDER — MAGNESIUM OXIDE 400 MG/1
400 TABLET ORAL 2 TIMES DAILY PRN
COMMUNITY

## 2025-05-06 RX ORDER — HYDROXYZINE PAMOATE 25 MG/1
25 CAPSULE ORAL EVERY 4 HOURS PRN
COMMUNITY

## 2025-05-06 RX ORDER — LISINOPRIL 10 MG/1
10 TABLET ORAL DAILY
COMMUNITY

## 2025-05-06 RX ORDER — LIDOCAINE, MENTHOL 4.8; 1.2 G/120G; G/120G
GEL ORAL 3 TIMES DAILY
COMMUNITY

## 2025-05-06 RX ORDER — BISACODYL 5 MG/1
5 TABLET, DELAYED RELEASE ORAL DAILY PRN
COMMUNITY

## 2025-05-06 NOTE — LETTER
5/6/2025      Lisbet Sanchez  [address]  [place] [address]        [facility] GERIATRICS    PRIMARY CARE PROVIDER AND CLINIC:  Aracely Nguyen MD, [address] / [place] [address]  Chief Complaint   Patient presents with     Hospital F/U      [facility] Medical Record Number:  [id number]  Place of Service where encounter took place:  [facility] ([facility])     Lisbet Sanchez  is a 64 year old  (1960), admitted to the above facility from  [facility]. Hospital stay 4/30/25 through 5/5/25..       Recent hospital courses were reviewed.    Pt has a PMH of chronic back pain s/p intrathecal pain pump placement with recent revision,obesity, chronic anxiety, HTN, HFpEF, chronic urinary incontinence, chronic constipation.    She has a history of cervical spine fusion 2022, lumbar spine stenosis with failed spinal fusion with fractured right sided [place] and L1, status post revision 10/2024.  She has had multiple hospitalizations and visits for falls precipitated by recurrent episodes of lower extremity weakness.    On April 28 she was seen in a pain clinic and was found that the intrathecal pain pump had been kinked and septally did have procedure to repair this.  She also has been noted to have bilateral 2-3 screw loosening and may need to have fusion above the T7 level with screw removal and further fusion.        She was hospitalized for the evaluation of worsening back right lower extremity weakness, left groin pain are acute on chronic LLE pain and weakness, following revision of pain pump at her outpt clinic. There was an initial concern for spine infection with sepsis, prompting hospitalization, imaging and neurosurgery evaluation.  Ultimately, picture was felt not to be consistent with infection; empiric antibiotics were discontinued.    Hospital course was complicated by toxic metabolic encephalopathy with hypoxia, felt secondary to  medications.  She maintained Klonopin and gabapentin prior to admission.  Gabapentin dose was reduced to 300 mg 3 times daily.  Klonopin was discontinued during hospitalization and not resumed.  Hypoxia was felt to be secondary to underlying obstructive sleep apnea in the setting of pain medications and muscle relaxants.  Mental status and O2 saturations improved.    She does have a history of acute on chronic urinary incontinence for which she often uses a pure wick for diapers at home.  Torres catheter was placed secondary to urinary retention during hospitalization and was not removed with recommendation for trial of voiding in the future when patient is more active.    Patient has a history of heart failure with ejection fraction over 75%, PTA on lisinopril and Bumex.  These medications were held then resumed during hospitalization.    Chronic depression/anxiety was managed with prior to admission BuSpar, desvenlafaxine and aripiprazole.    As above, chronic pain was discontinued and gabapentin dose was reduced.  Patient states she was taking clonazepam generally 1 dose per day and has done so on a chronic basis.  She was discharged on oxycodone 10 mg every 4 hours as needed which is her home dose, Robaxin 500 mg every 6 hours as needed spasms, gabapentin 300 mg 4 times daily.  Lidocaine patch.    Patient states back pain, right-sided weakness, right groin pain right foot numbness are under fair control.  She notes chronic right thigh discomfort.  She states at baseline she does ambulate in her home with a cane or a walker.  She lives with her  and 1 child.  She does have 7 stairs to traverse in her home.    She notes chronic constipation and states she believes she had a bowel movement 2 to 3 days ago.    She denies cough, chest pain, shortness of breath.  She denies worsened lower extremity edema.    She notes mild anxiety, denies dizziness, palpitations.    CODE STATUS/ADVANCE DIRECTIVES DISCUSSION:   Full Code  CPR/Full code   ALLERGIES:   Allergies   Allergen Reactions     Cephalexin Anaphylaxis, Hives, Rash, Difficulty breathing, Shortness Of Breath and Unknown     Other Reaction(s): Not available, Swelling, lips/tongue, Unknown    Other reaction(s): Hives, Hives     Cephalosporins      Other Reaction(s): *Unknown     Vancomycin Other (See Comments)     neutropenia    Other Reaction(s): Neutropenia      PAST MEDICAL HISTORY: As noted above  PAST SURGICAL HISTORY:   has a past surgical history that includes IR Lumbar Puncture (10/31/2018).  FAMILY HISTORY: family history is not on file.  SOCIAL HISTORY:   reports that she has never smoked. She has never used smokeless tobacco. She reports current alcohol use. She reports that she does not use drugs.  Patient's living condition: lives with spouse    Current medications reviewed by me today    Current Outpatient Medications   Medication Sig Dispense Refill     ARIPiprazole (ABILIFY) 30 MG tablet Take 30 mg by mouth daily.       artificial saliva (BIOTENE MT) SOLN solution Swish and spit 15 mLs in mouth every 4 hours as needed for dry mouth.       atorvastatin (LIPITOR) 10 MG tablet Take 10 mg by mouth daily.       bisacodyl (DULCOLAX) 5 MG EC tablet Take 5 mg by mouth daily as needed for constipation.       bumetanide (BUMEX) 0.5 MG tablet Take 0.5 mg by mouth 2 times daily.       busPIRone HCl (BUSPAR) 30 MG tablet Take 30 mg by mouth 2 times daily.       Calcium Carb-Cholecalciferol (CALCIUM 500 + D) 500-5 MG-MCG TABS Take 1 tablet by mouth daily.       clindamycin (CLEOCIN) 300 MG capsule Take 300 mg by mouth 3 times daily.       Cranberry 450 MG TABS Take 450 mg by mouth daily.       desvenlafaxine (PRISTIQ) 100 MG 24 hr tablet Take 100 mg by mouth daily.       eletriptan (RELPAX) 20 MG tablet Take 20 mg by mouth 2 times daily as needed for migraine.       gabapentin (GRALISE) 300 MG 24 hr tablet Take 300 mg by mouth 4 times daily.       hydrOXYzine bridget  (VISTARIL) 25 MG capsule Take 25 mg by mouth every 4 hours as needed for anxiety.       lactulose (CHRONULAC) 10 GM/15ML solution Take 10 g by mouth 2 times daily.       levothyroxine (SYNTHROID/LEVOTHROID) 175 MCG tablet Take 175 mcg by mouth daily.       Lidocaine (LIDOCARE) 4 % Patch Place 1 patch onto the skin every 24 hours. To prevent lidocaine toxicity, patient should be patch free for 12 hrs daily.       Lidocaine-Menthol 4-1 % GEL Apply topically 3 times daily.       lisinopril (ZESTRIL) 10 MG tablet Take 10 mg by mouth daily.       magnesium oxide (MAG-OX) 400 MG tablet Take 400 mg by mouth 2 times daily as needed.       methocarbamol (ROBAXIN) 500 MG tablet Take 500 mg by mouth every 6 hours as needed for muscle spasms.       metoprolol succinate ER (TOPROL XL) 25 MG 24 hr tablet Take 12.5 mg by mouth daily.       multivitamin (CENTRUM SILVER) tablet Take 1 tablet by mouth daily.       naloxone (NARCAN) 4 MG/0.1ML nasal spray Spray 4 mg into one nostril alternating nostrils as needed for opioid reversal. every 2-3 minutes until assistance arrives       ondansetron (ZOFRAN ODT) 4 MG ODT tab Take 4 mg by mouth every 8 hours as needed for nausea.       oxyBUTYnin (DITROPAN) 5 MG tablet Take 5 mg by mouth 2 times daily.       oxyCODONE IR (ROXICODONE) 10 MG tablet Take 10 mg by mouth every 4 hours as needed for severe pain.       polyethylene glycol (MIRALAX) 17 g packet Take 17 g by mouth daily as needed.       pseudoePHEDrine (SUDAFED) 60 MG tablet Take 120 mg by mouth daily as needed for congestion.       senna-docusate (SENOKOT-S/PERICOLACE) 8.6-50 MG tablet Take 1 tablet by mouth 2 times daily as needed.       sennosides (SENOKOT) 8.6 MG tablet Take 2 tablets by mouth 2 times daily as needed for constipation.               lidocaine (LMX4) 4 % external cream Apply topically 3 times daily. Apply to low back       No current facility-administered medications for this visit.       ROS:  10 point ROS of  "systems including Constitutional, Eyes, Respiratory, Cardiovascular, Gastroenterology, Genitourinary, Integumentary, Musculoskeletal, Psychiatric were all negative except for pertinent positives noted in my HPI.    Vitals:  BP (!) 154/78   Pulse 59   Temp 97  F (36.1  C)   Resp 18   Ht 1.626 m (5' 4\")   Wt 94.4 kg (208 lb 3.2 oz)   SpO2 95%   BMI 35.74 kg/m    Exam:  Pleasant, obese female, lying comfortably in bed, in NAD  HEENT oral mucosa moist  Lungs clear  CV rrr  Abd soft, protuberant  LE no edema  NEURO fully oriented, CN intact  B LE weakness, R weaker than L, as assessed with Pt lying in bed  She is able to lift both heels off the surface of bed  Decreased sensation R foot and ankle  Mood appears appropriate  Sl resting tremors of both hands    Lab/Diagnostic data:  Reviewed in Epic    ASSESSMENT/PLAN:    # Acute on chronic pain, chronic bilateral lower extremity weakness, greater greater than left.  Initial concern that worsening symptoms related to kinking of intrathecal catheter.  No apparent significant movement with modification of catheter.  In fact, initial concern for worsening neurologic symptoms, prompting evaluation for possible infection.  Ultimately, no spine infection suspected.  Patient is essentially in the same pain medication regimen as before with exception of a reduced dose of gabapentin for concern for sedation.  Plan: Therapies.  Close follow-up with pain clinic regarding intrathecal catheter.  Continue reduced dose gabapentin, muscle relaxants, as needed oxycodone.  Monitor bowel status given chronic constipation    # Complex spine history as noted above with history of cervical thoracic and lumbar spine fusion  Outpatient notes indicate possible need for further thoracic spine fusion with removal of screws  Plan: Continue therapies.  Pain medication regimen as above.  Follow-up with pain team and surgery    # Chronic depression, WILMAN  On multiple medications as noted above  Of " note, clonazepam was discontinued during hospitalization.  Patient has been without this medication for approximately 1 week.  No evidence of significant withdrawal although theoretically, this still could occur given long half-life of medication.  Ideally, it would be best if patient could remain off benzodiazepines.  Plan: Monitor mental status, should there be symptoms or signs of benzo that withdrawal would need to resume clonazepam at reduced dose.  Discussed with patient    # Encephalopathy with hypoxia noted during hospitalization, felt secondary to sedating medications  Resolved with reduction in medications  Patient currently is fully oriented.  Plan: Continue judicious use of medications, avoid escalation of opioids.    # Hypoxia noted early in hospitalization in the setting encephalopathy  Question of sleep apnea.  Will need to clarify with patient if she has been evaluated for sleep apnea in the past as I am not certain of this.  In the meantime, monitor respiratory status, minimize sedating medications as possible    # Urinary retention in the setting of acute pain, immobility and perhaps constipation  History of chronic urinary incontinence for which the patient is prescribed oxybutynin  Plan: Discontinue oxybutynin given current issue with urinary retention.  Manage constipation.  Consider trial of voiding when patient is more ambulatory.    # History of heart failure with preserved ejection fraction, on chronic Bumex, statin, lisinopril, metoprolol.  All medicines were discontinued in the setting of hypotension early during the patient's hospital course, have since been resumed  Patient appears euvolemic  Plan: Monitor exam, monitor blood pressure, BMP.  Weigh Monday Wednesday and Friday    # History of migraine headaches  Plan: Continue Relpax as needed    # Hypothyroidism  TSH 0.56 in February 2025  Plan: Continue levothyroxine, routine lab monitoring.      Romain Bartholomew,  MD            Sincerely,        Romain Bartholomew MD    Electronically signed

## 2025-05-06 NOTE — PROGRESS NOTES
[facility] GERIATRICS    PRIMARY CARE PROVIDER AND CLINIC:  Aracely Nguyen MD, [address] / [place] [address]  Chief Complaint   Patient presents with    Hospital F/U      [facility] Medical Record Number:  [id number]  Place of Service where encounter took place:  [facility] ([facility])     Lisbet Sanchez  is a 64 year old  (1960), admitted to the above facility from  [facility]. Hospital stay 4/30/25 through 5/5/25..       Recent hospital courses were reviewed.    Pt has a PMH of chronic back pain s/p intrathecal pain pump placement with recent revision,obesity, chronic anxiety, HTN, HFpEF, chronic urinary incontinence, chronic constipation.    She has a history of cervical spine fusion 2022, lumbar spine stenosis with failed spinal fusion with fractured right sided [place] and L1, status post revision 10/2024.  She has had multiple hospitalizations and visits for falls precipitated by recurrent episodes of lower extremity weakness.    On April 28 she was seen in a pain clinic and was found that the intrathecal pain pump had been kinked and septally did have procedure to repair this.  She also has been noted to have bilateral 2-3 screw loosening and may need to have fusion above the T7 level with screw removal and further fusion.        She was hospitalized for the evaluation of worsening back right lower extremity weakness, left groin pain are acute on chronic LLE pain and weakness, following revision of pain pump at her outpt clinic. There was an initial concern for spine infection with sepsis, prompting hospitalization, imaging and neurosurgery evaluation.  Ultimately, picture was felt not to be consistent with infection; empiric antibiotics were discontinued.    Hospital course was complicated by toxic metabolic encephalopathy with hypoxia, felt secondary to medications.  She maintained Klonopin and gabapentin prior to admission.  Gabapentin dose was  reduced to 300 mg 3 times daily.  Klonopin was discontinued during hospitalization and not resumed.  Hypoxia was felt to be secondary to underlying obstructive sleep apnea in the setting of pain medications and muscle relaxants.  Mental status and O2 saturations improved.    She does have a history of acute on chronic urinary incontinence for which she often uses a pure wick for diapers at home.  Torres catheter was placed secondary to urinary retention during hospitalization and was not removed with recommendation for trial of voiding in the future when patient is more active.    Patient has a history of heart failure with ejection fraction over 75%, PTA on lisinopril and Bumex.  These medications were held then resumed during hospitalization.    Chronic depression/anxiety was managed with prior to admission BuSpar, desvenlafaxine and aripiprazole.    As above, chronic pain was discontinued and gabapentin dose was reduced.  Patient states she was taking clonazepam generally 1 dose per day and has done so on a chronic basis.  She was discharged on oxycodone 10 mg every 4 hours as needed which is her home dose, Robaxin 500 mg every 6 hours as needed spasms, gabapentin 300 mg 4 times daily.  Lidocaine patch.    Patient states back pain, right-sided weakness, right groin pain right foot numbness are under fair control.  She notes chronic right thigh discomfort.  She states at baseline she does ambulate in her home with a cane or a walker.  She lives with her  and 1 child.  She does have 7 stairs to traverse in her home.    She notes chronic constipation and states she believes she had a bowel movement 2 to 3 days ago.    She denies cough, chest pain, shortness of breath.  She denies worsened lower extremity edema.    She notes mild anxiety, denies dizziness, palpitations.    CODE STATUS/ADVANCE DIRECTIVES DISCUSSION:  Full Code  CPR/Full code   ALLERGIES:   Allergies   Allergen Reactions    Cephalexin  Anaphylaxis, Hives, Rash, Difficulty breathing, Shortness Of Breath and Unknown     Other Reaction(s): Not available, Swelling, lips/tongue, Unknown    Other reaction(s): Hives, Hives    Cephalosporins      Other Reaction(s): *Unknown    Vancomycin Other (See Comments)     neutropenia    Other Reaction(s): Neutropenia      PAST MEDICAL HISTORY: As noted above  PAST SURGICAL HISTORY:   has a past surgical history that includes IR Lumbar Puncture (10/31/2018).  FAMILY HISTORY: family history is not on file.  SOCIAL HISTORY:   reports that she has never smoked. She has never used smokeless tobacco. She reports current alcohol use. She reports that she does not use drugs.  Patient's living condition: lives with spouse    Current medications reviewed by me today    Current Outpatient Medications   Medication Sig Dispense Refill    ARIPiprazole (ABILIFY) 30 MG tablet Take 30 mg by mouth daily.      artificial saliva (BIOTENE MT) SOLN solution Swish and spit 15 mLs in mouth every 4 hours as needed for dry mouth.      atorvastatin (LIPITOR) 10 MG tablet Take 10 mg by mouth daily.      bisacodyl (DULCOLAX) 5 MG EC tablet Take 5 mg by mouth daily as needed for constipation.      bumetanide (BUMEX) 0.5 MG tablet Take 0.5 mg by mouth 2 times daily.      busPIRone HCl (BUSPAR) 30 MG tablet Take 30 mg by mouth 2 times daily.      Calcium Carb-Cholecalciferol (CALCIUM 500 + D) 500-5 MG-MCG TABS Take 1 tablet by mouth daily.      clindamycin (CLEOCIN) 300 MG capsule Take 300 mg by mouth 3 times daily.      Cranberry 450 MG TABS Take 450 mg by mouth daily.      desvenlafaxine (PRISTIQ) 100 MG 24 hr tablet Take 100 mg by mouth daily.      eletriptan (RELPAX) 20 MG tablet Take 20 mg by mouth 2 times daily as needed for migraine.      gabapentin (GRALISE) 300 MG 24 hr tablet Take 300 mg by mouth 4 times daily.      hydrOXYzine bridget (VISTARIL) 25 MG capsule Take 25 mg by mouth every 4 hours as needed for anxiety.      lactulose  (CHRONULAC) 10 GM/15ML solution Take 10 g by mouth 2 times daily.      levothyroxine (SYNTHROID/LEVOTHROID) 175 MCG tablet Take 175 mcg by mouth daily.      Lidocaine (LIDOCARE) 4 % Patch Place 1 patch onto the skin every 24 hours. To prevent lidocaine toxicity, patient should be patch free for 12 hrs daily.      Lidocaine-Menthol 4-1 % GEL Apply topically 3 times daily.      lisinopril (ZESTRIL) 10 MG tablet Take 10 mg by mouth daily.      magnesium oxide (MAG-OX) 400 MG tablet Take 400 mg by mouth 2 times daily as needed.      methocarbamol (ROBAXIN) 500 MG tablet Take 500 mg by mouth every 6 hours as needed for muscle spasms.      metoprolol succinate ER (TOPROL XL) 25 MG 24 hr tablet Take 12.5 mg by mouth daily.      multivitamin (CENTRUM SILVER) tablet Take 1 tablet by mouth daily.      naloxone (NARCAN) 4 MG/0.1ML nasal spray Spray 4 mg into one nostril alternating nostrils as needed for opioid reversal. every 2-3 minutes until assistance arrives      ondansetron (ZOFRAN ODT) 4 MG ODT tab Take 4 mg by mouth every 8 hours as needed for nausea.      oxyBUTYnin (DITROPAN) 5 MG tablet Take 5 mg by mouth 2 times daily.      oxyCODONE IR (ROXICODONE) 10 MG tablet Take 10 mg by mouth every 4 hours as needed for severe pain.      polyethylene glycol (MIRALAX) 17 g packet Take 17 g by mouth daily as needed.      pseudoePHEDrine (SUDAFED) 60 MG tablet Take 120 mg by mouth daily as needed for congestion.      senna-docusate (SENOKOT-S/PERICOLACE) 8.6-50 MG tablet Take 1 tablet by mouth 2 times daily as needed.      sennosides (SENOKOT) 8.6 MG tablet Take 2 tablets by mouth 2 times daily as needed for constipation.             lidocaine (LMX4) 4 % external cream Apply topically 3 times daily. Apply to low back       No current facility-administered medications for this visit.       ROS:  10 point ROS of systems including Constitutional, Eyes, Respiratory, Cardiovascular, Gastroenterology, Genitourinary, Integumentary,  "Musculoskeletal, Psychiatric were all negative except for pertinent positives noted in my HPI.    Vitals:  BP (!) 154/78   Pulse 59   Temp 97  F (36.1  C)   Resp 18   Ht 1.626 m (5' 4\")   Wt 94.4 kg (208 lb 3.2 oz)   SpO2 95%   BMI 35.74 kg/m    Exam:  Pleasant, obese female, lying comfortably in bed, in NAD  HEENT oral mucosa moist  Lungs clear  CV rrr  Abd soft, protuberant  LE no edema  NEURO fully oriented, CN intact  B LE weakness, R weaker than L, as assessed with Pt lying in bed  She is able to lift both heels off the surface of bed  Decreased sensation R foot and ankle  Mood appears appropriate  Sl resting tremors of both hands    Lab/Diagnostic data:  Reviewed in Epic    ASSESSMENT/PLAN:    # Acute on chronic pain, chronic bilateral lower extremity weakness, greater greater than left.  Initial concern that worsening symptoms related to kinking of intrathecal catheter.  No apparent significant movement with modification of catheter.  In fact, initial concern for worsening neurologic symptoms, prompting evaluation for possible infection.  Ultimately, no spine infection suspected.  Patient is essentially in the same pain medication regimen as before with exception of a reduced dose of gabapentin for concern for sedation.  Plan: Therapies.  Close follow-up with pain clinic regarding intrathecal catheter.  Continue reduced dose gabapentin, muscle relaxants, as needed oxycodone.  Monitor bowel status given chronic constipation    # Complex spine history as noted above with history of cervical thoracic and lumbar spine fusion  Outpatient notes indicate possible need for further thoracic spine fusion with removal of screws  Plan: Continue therapies.  Pain medication regimen as above.  Follow-up with pain team and surgery    # Chronic depression, WILMAN  On multiple medications as noted above  Of note, clonazepam was discontinued during hospitalization.  Patient has been without this medication for approximately " 1 week.  No evidence of significant withdrawal although theoretically, this still could occur given long half-life of medication.  Ideally, it would be best if patient could remain off benzodiazepines.  Plan: Monitor mental status, should there be symptoms or signs of benzo that withdrawal would need to resume clonazepam at reduced dose.  Discussed with patient    # Encephalopathy with hypoxia noted during hospitalization, felt secondary to sedating medications  Resolved with reduction in medications  Patient currently is fully oriented.  Plan: Continue judicious use of medications, avoid escalation of opioids.    # Hypoxia noted early in hospitalization in the setting encephalopathy  Question of sleep apnea.  Will need to clarify with patient if she has been evaluated for sleep apnea in the past as I am not certain of this.  In the meantime, monitor respiratory status, minimize sedating medications as possible    # Urinary retention in the setting of acute pain, immobility and perhaps constipation  History of chronic urinary incontinence for which the patient is prescribed oxybutynin  Plan: Discontinue oxybutynin given current issue with urinary retention.  Manage constipation.  Consider trial of voiding when patient is more ambulatory.    # History of heart failure with preserved ejection fraction, on chronic Bumex, statin, lisinopril, metoprolol.  All medicines were discontinued in the setting of hypotension early during the patient's hospital course, have since been resumed  Patient appears euvolemic  Plan: Monitor exam, monitor blood pressure, BMP.  Weigh Monday Wednesday and Friday    # History of migraine headaches  Plan: Continue Relpax as needed    # Hypothyroidism  TSH 0.56 in February 2025  Plan: Continue levothyroxine, routine lab monitoring.      Romain Bartholomew MD

## 2025-05-07 ENCOUNTER — LAB REQUISITION (OUTPATIENT)
Dept: LAB | Facility: CLINIC | Age: 65
End: 2025-05-07
Payer: COMMERCIAL

## 2025-05-07 ENCOUNTER — TELEPHONE (OUTPATIENT)
Dept: GERIATRICS | Facility: CLINIC | Age: 65
End: 2025-05-07
Payer: COMMERCIAL

## 2025-05-07 DIAGNOSIS — R60.9 EDEMA, UNSPECIFIED: ICD-10-CM

## 2025-05-07 DIAGNOSIS — J96.21 ACUTE AND CHRONIC RESPIRATORY FAILURE WITH HYPOXIA (H): ICD-10-CM

## 2025-05-07 LAB
GAMMA INTERFERON BACKGROUND BLD IA-ACNC: 0.06 IU/ML
M TB IFN-G BLD-IMP: NEGATIVE
M TB IFN-G CD4+ BCKGRND COR BLD-ACNC: 9.94 IU/ML
MITOGEN IGNF BCKGRD COR BLD-ACNC: 0 IU/ML
MITOGEN IGNF BCKGRD COR BLD-ACNC: 0.02 IU/ML
QUANTIFERON MITOGEN: 10 IU/ML
QUANTIFERON NIL TUBE: 0.06 IU/ML
QUANTIFERON TB1 TUBE: 0.06 IU/ML
QUANTIFERON TB2 TUBE: 0.08
RSV AG SPEC QL: NEGATIVE

## 2025-05-07 PROCEDURE — 87807 RSV ASSAY W/OPTIC: CPT | Performed by: NURSE PRACTITIONER

## 2025-05-07 NOTE — TELEPHONE ENCOUNTER
"[facility] Triage Call    Provider: MELODY Auguste CNP   Facility: [facility] Facility Type:  TCU    Caller: Jaclyn   Call Back Number: [phone]    Allergies:    Allergies   Allergen Reactions    Cephalexin Anaphylaxis, Hives, Rash, Difficulty breathing, Shortness Of Breath and Unknown     Other Reaction(s): Not available, Swelling, lips/tongue, Unknown    Other reaction(s): Hives, Hives    Cephalosporins      Other Reaction(s): *Unknown    Vancomycin Other (See Comments)     neutropenia    Other Reaction(s): Neutropenia        SBAR:     S-(situation): Today the nurse is calling on behalf of the patient as she is requesting her previous Klonopin 0.5 mg to be reinstated.  Patient had a Klonopin 0.5 mg 3 times a day as needed for panic attacks.  This was discontinued on discharge from the hospital.     Patient also would like to see a provider of increased shortness of breath while lying down.     B-(background): History of heart failure with ejection fraction over 75.  Currently on lisinopril and Bumex.   Previous dosing of Klonopin 0.5 mg 3 times a day as needed.     A-(assessment): /53, P65, T97.6, RR 18, O2 97% room air.   Lung sounds clear, no cough, patient reports increased shortness of breath while laying flat.   No current weight on file-patient to be weighed 3 times a week.     R-(recommendations): Patient requesting her Klonopin 0.5 mg 3 times a day as needed to be reinstated for anxiety attacks. (Patient is willing to do a lower dose or less frequent if needed)  Patient also concerned about increased shortness of breath while laying flat.  Any new orders?      Telephone encounter sent to:  MELODY Schmitt CNP     Please send response/orders to \"Geriatrics Nurse Pool\"    Brittany Ramirez RN      "

## 2025-05-08 ENCOUNTER — RESULTS FOLLOW-UP (OUTPATIENT)
Dept: GERIATRICS | Facility: CLINIC | Age: 65
End: 2025-05-08

## 2025-05-08 VITALS
BODY MASS INDEX: 35.55 KG/M2 | HEART RATE: 68 BPM | SYSTOLIC BLOOD PRESSURE: 149 MMHG | DIASTOLIC BLOOD PRESSURE: 68 MMHG | HEIGHT: 64 IN | TEMPERATURE: 97 F | WEIGHT: 208.2 LBS | RESPIRATION RATE: 18 BRPM | OXYGEN SATURATION: 97 %

## 2025-05-08 LAB
ANION GAP SERPL CALCULATED.3IONS-SCNC: 9 MMOL/L (ref 7–15)
BUN SERPL-MCNC: 17.3 MG/DL (ref 8–23)
CALCIUM SERPL-MCNC: 9.2 MG/DL (ref 8.8–10.4)
CHLORIDE SERPL-SCNC: 101 MMOL/L (ref 98–107)
CREAT SERPL-MCNC: 0.7 MG/DL (ref 0.51–0.95)
EGFRCR SERPLBLD CKD-EPI 2021: >90 ML/MIN/1.73M2
GLUCOSE SERPL-MCNC: 102 MG/DL (ref 70–99)
HCO3 SERPL-SCNC: 32 MMOL/L (ref 22–29)
POTASSIUM SERPL-SCNC: 4.1 MMOL/L (ref 3.4–5.3)
SODIUM SERPL-SCNC: 142 MMOL/L (ref 135–145)

## 2025-05-08 PROCEDURE — 82947 ASSAY GLUCOSE BLOOD QUANT: CPT | Performed by: NURSE PRACTITIONER

## 2025-05-08 PROCEDURE — P9604 ONE-WAY ALLOW PRORATED TRIP: HCPCS | Performed by: NURSE PRACTITIONER

## 2025-05-08 PROCEDURE — 36415 COLL VENOUS BLD VENIPUNCTURE: CPT | Performed by: NURSE PRACTITIONER

## 2025-05-08 NOTE — PROGRESS NOTES
[facility]  [place] Medical Record Number:  [id number]  Place of Service where encounter took place:  [facility] [[id number]]  Chief Complaint   Patient presents with    Nursing Home Acute       HPI:    Lisbet Sanchez  is a 64 year old (1960), who is being seen today for an episodic care visit.  HPI information obtained from: facility chart records, facility staff, and patient report. Today's concern is:    Per epic report:    Patient Lisbet Sanchez is a 64 yr old female admitted to [facility] for rehabilitation s/post hospitalization [facility] 4/30-5/5/25 for concern for worsening back right lower extremity weakness, left groin pain are acute on chronic LLE pain and weakness, following revision of pain pump at her outpt clinic. There was an initial concern for spine infection with sepsis, prompting hospitalization, imaging and neurosurgery evaluation.  Ultimately, picture was felt not to be consistent with infection; empiric antibiotics were discontinued.     Hospital course was complicated by toxic metabolic encephalopathy with hypoxia, felt secondary to medications.  She maintained Klonopin and gabapentin prior to admission.  Gabapentin dose was reduced to 300 mg 3 times daily.  Klonopin was discontinued during hospitalization and not resumed.  Hypoxia was felt to be secondary to underlying obstructive sleep apnea in the setting of pain medications and muscle relaxants.  Mental status and O2 saturations improved.    Complications urinary retention    PMHx history cervical spine fusion 2022, lumbar spine stenosis with failed spinal fusion with spinal fusion with fractured right sided [place] and L1, status post revision 10/2024.  She has had multiple hospitalizations and visits for falls precipitated by recurrent episodes of lower extremity weakness  On April 28 she was seen in a pain clinic and was found that the  intrathecal pain pump had been kinked and septally did have procedure to repair this.  She also has been noted to have bilateral 2-3 screw loosening and may need to have fusion above the T7 level with screw removal and further fusion.    PMHx hypertension, HFpEF, urinary incontinence, chronic constipation, hypothyroidism, migraine, MDD/WILMAN, obesity    Today ***  Off clonazepam and neurontin lowered due to hypoxia   start atarax 5/7  Sob, pending chest xray    Ambulates with cane or walker at home, lives with spouse and child, has 7 stairs   Med match***  Follow up spine special  Dysphagia ***    Severe sepsis in the context of recent alteration to intrathecal pump  Bilateral leg pain and weakness  Acute on chronic low back pain  RLS  Per epic report   CT L-spine showed interval development of small amount of gas within the ventral epidural space at the L1 level that may represent post procedural changes as well as small amount of enhancement and air within the subcutaneous soft tissues dorsally at the L2-3 level represent recent surgical intervention. CT also showed stable extensive postsurgical changes of the thoracolumbar spine as well as the sacrum.    - Pain control with intrathecal pump***?  Neurontin lowered in hospital  continue Gabapentin (dose reduced in hospital) and as needed oxycodone 10mg every 4hrs as needed   Follow up pain clinic as advised ***  Outpatient notes indicate possible need for further thoracic spine fusion with removal of screws ***  Avoid increase dose narcotics given hypoxia    Obesity, BMI 35.7  increased morbility and mortality and increases in nursing cares and use of resources.  Encourage portion control and increase fruits and vegetables and encourage physical activity  Ozempic?***  Dietary referral ***      Hypertension  Blood pressure, hr ***  Lisinopril and metoprolol  HFpEF (LVEF > 75% per echocardiogram on 2/8/2025  Continue Bumex 0.5mg 2xday  Wt Readings from Last 2  Encounters:   05/08/25 94.4 kg (208 lb 3.2 oz)   05/06/25 94.4 kg (208 lb 3.2 oz)   Appear dry ?***  Shortness of breath  Pending chest xray ***  Potential sleep apnea, future sleep study ***  Consider oxygen at night ***  Daily weights ***    MDD  WILMAN  Mood***  Continue Buspirone, desvenlafaxine, aripiprazole  clonazepam discontinue in hospital ***  Atarax as needed due to some anxiety  Referral house psychologist     Hypothyroidism  TSH   Date Value Ref Range Status   02/21/2025 0.56 0.30 - 4.20 uIU/mL Final   Continue Levothyroxine  Monitor     Chronic constipation  Continue Lactulose  Consider senna S as needed ***  Las BM  Monitor     Urinary retention  Off oxybutynin   Plan future voiding trial when more mobile***  Monitor     Migraines  Continue Replax as needed ***    Deconditioned  Comment: d/t recent hospitalization & comorbidity, expect delay rehabilitation d/t age & comorbidity  Plan: PT/OT eval & treat, monitor    Advanced care planning ***    Follow up BMP,CBC ***      Past Medical and Surgical History reviewed in Epic today.    MEDICATIONS:    Current Outpatient Medications   Medication Sig Dispense Refill    ARIPiprazole (ABILIFY) 30 MG tablet Take 30 mg by mouth daily.      artificial saliva (BIOTENE MT) AERS spray Take 1 spray by mouth every 4 hours as needed for dry mouth.      artificial saliva (BIOTENE MT) SOLN solution Swish and spit 15 mLs in mouth every 4 hours as needed for dry mouth.      atorvastatin (LIPITOR) 10 MG tablet Take 10 mg by mouth daily.      bisacodyl (DULCOLAX) 5 MG EC tablet Take 5 mg by mouth daily as needed for constipation.      bumetanide (BUMEX) 0.5 MG tablet Take 0.5 mg by mouth 2 times daily.      busPIRone HCl (BUSPAR) 30 MG tablet Take 30 mg by mouth 2 times daily.      Calcium Carb-Cholecalciferol (CALCIUM 500 + D) 500-5 MG-MCG TABS Take 1 tablet by mouth daily.      clindamycin (CLEOCIN) 300 MG capsule Take 300 mg by mouth 3 times daily.      Cranberry 450 MG TABS  Take 450 mg by mouth daily.      desvenlafaxine (PRISTIQ) 100 MG 24 hr tablet Take 100 mg by mouth daily.      eletriptan (RELPAX) 20 MG tablet Take 20 mg by mouth 2 times daily as needed for migraine.      hydrOXYzine bridget (VISTARIL) 25 MG capsule Take 25 mg by mouth every 4 hours as needed for anxiety.      lactulose (CHRONULAC) 10 GM/15ML solution Take 10 g by mouth 2 times daily.      levothyroxine (SYNTHROID/LEVOTHROID) 175 MCG tablet Take 175 mcg by mouth daily.      Lidocaine (LIDOCARE) 4 % Patch Place 1 patch onto the skin every 24 hours. To prevent lidocaine toxicity, patient should be patch free for 12 hrs daily.      lidocaine (LMX4) 4 % external cream Apply topically 3 times daily. Apply to low back      Lidocaine-Menthol 4-1 % GEL Apply topically 3 times daily.      lisinopril (ZESTRIL) 10 MG tablet Take 10 mg by mouth daily.      magnesium oxide (MAG-OX) 400 MG tablet Take 400 mg by mouth 2 times daily as needed.      methocarbamol (ROBAXIN) 500 MG tablet Take 500 mg by mouth every 6 hours as needed for muscle spasms.      metoprolol succinate ER (TOPROL XL) 25 MG 24 hr tablet Take 12.5 mg by mouth daily.      naloxone (NARCAN) 4 MG/0.1ML nasal spray Spray 4 mg into one nostril alternating nostrils as needed for opioid reversal. every 2-3 minutes until assistance arrives      ondansetron (ZOFRAN ODT) 4 MG ODT tab Take 4 mg by mouth every 8 hours as needed for nausea.      oxyCODONE IR (ROXICODONE) 10 MG tablet Take 10 mg by mouth every 4 hours as needed for severe pain.      polyethylene glycol (MIRALAX) 17 g packet Take 17 g by mouth daily as needed.      pseudoePHEDrine (SUDAFED) 60 MG tablet Take 120 mg by mouth daily as needed for congestion.      senna-docusate (SENOKOT-S/PERICOLACE) 8.6-50 MG tablet Take 1 tablet by mouth 2 times daily as needed.      sennosides (SENOKOT) 8.6 MG tablet Take 2 tablets by mouth 2 times daily as needed for constipation.      gabapentin (GRALISE) 300 MG 24 hr tablet  "Take 300 mg by mouth 4 times daily.      multivitamin (CENTRUM SILVER) tablet Take 1 tablet by mouth daily.           REVIEW OF SYSTEMS:  4 point ROS including Respiratory, CV, GI and , other than that noted in the HPI,  is negative    Objective:  BP (!) 149/68   Pulse 68   Temp 97  F (36.1  C)   Resp 18   Ht 1.626 m (5' 4\")   Wt 94.4 kg (208 lb 3.2 oz)   SpO2 97%   BMI 35.74 kg/m    Exam:  GENERAL APPEARANCE:  {[facility] GENERAL APPEARANCE:589330}  RESP:  {[facility] HOME RESP PE:955164}  CV:  {[facility] CV PE:315483::\"Palpation and auscultation of heart done \",\"regular rate and rhythm, no murmur, rub, or gallop\"}  ABDOMEN:  {[facility] GI PE:226694::\"normal bowel sounds, soft, nontender, no hepatosplenomegaly or other masses\"}  M/S:   {[facility] M/S PE:[id number]}  SKIN:  {[facility] SKIN PE:020591}  NEURO:   {[facility] NEURO PE:413711}  PSYCH:  {[facility] PSYCH PE:[id number]}    Labs:   Labs done in SNF are in [place] [facility]. Please refer to them using [employer]/Care Everywhere.    Last Comprehensive Metabolic Panel:  Lab Results   Component Value Date     05/08/2025    POTASSIUM 4.1 05/08/2025    CHLORIDE 101 05/08/2025    CO2 32 (H) 05/08/2025    ANIONGAP 9 05/08/2025     (H) 05/08/2025    BUN 17.3 05/08/2025    CR 0.70 05/08/2025    GFRESTIMATED >90 05/08/2025    MARIELY 9.2 05/08/2025       Lab Results   Component Value Date    WBC 5.4 03/10/2025    WBC 5.7 03/08/2009     Lab Results   Component Value Date    RBC 3.99 03/10/2025    RBC 3.15 03/08/2009     Lab Results   Component Value Date    HGB 10.7 03/10/2025    HGB 9.3 03/08/2009     Lab Results   Component Value Date    HCT 33.6 03/10/2025    HCT 28.6 03/08/2009     No components found for: \"MCT\"  Lab Results   Component Value Date    MCV 84 03/10/2025    MCV 91 03/08/2009     Lab Results   Component Value Date    MCH 26.8 03/10/2025    MCH 29.5 03/08/2009     Lab Results   Component Value Date    MCHC 31.8 03/10/2025    MCHC 32.5 " 03/08/2009     Lab Results   Component Value Date    RDW 14.1 03/10/2025    RDW 14.2 03/08/2009     Lab Results   Component Value Date     03/10/2025     03/11/2009           ASSESSMENT/PLAN:     Right leg weakness  S/P insertion of intrathecal pump  Chronic constipation  Heart failure with preserved ejection fraction, NYHA class I (H)  Physical deconditioning  Hx of spinal fusion  Benign essential hypertension  Chronic, continuous use of opioids***            Electronically signed by:  Natividad Woodson

## 2025-05-09 ENCOUNTER — TRANSITIONAL CARE UNIT VISIT (OUTPATIENT)
Dept: GERIATRICS | Facility: CLINIC | Age: 65
End: 2025-05-09
Payer: COMMERCIAL

## 2025-05-09 ENCOUNTER — RESULTS FOLLOW-UP (OUTPATIENT)
Dept: GERIATRICS | Facility: CLINIC | Age: 65
End: 2025-05-09

## 2025-05-09 DIAGNOSIS — Z98.890 S/P INSERTION OF INTRATHECAL PUMP: ICD-10-CM

## 2025-05-09 DIAGNOSIS — I10 BENIGN ESSENTIAL HYPERTENSION: ICD-10-CM

## 2025-05-09 DIAGNOSIS — I50.30 HEART FAILURE WITH PRESERVED EJECTION FRACTION, NYHA CLASS I (H): ICD-10-CM

## 2025-05-09 DIAGNOSIS — R29.898 RIGHT LEG WEAKNESS: Primary | ICD-10-CM

## 2025-05-09 DIAGNOSIS — K59.09 CHRONIC CONSTIPATION: ICD-10-CM

## 2025-05-09 DIAGNOSIS — Z98.1 HX OF SPINAL FUSION: ICD-10-CM

## 2025-05-09 DIAGNOSIS — R53.81 PHYSICAL DECONDITIONING: ICD-10-CM

## 2025-05-09 DIAGNOSIS — F11.90 CHRONIC, CONTINUOUS USE OF OPIOIDS: ICD-10-CM

## 2025-05-13 ENCOUNTER — TRANSITIONAL CARE UNIT VISIT (OUTPATIENT)
Dept: GERIATRICS | Facility: CLINIC | Age: 65
End: 2025-05-13
Payer: COMMERCIAL

## 2025-05-13 VITALS
SYSTOLIC BLOOD PRESSURE: 140 MMHG | TEMPERATURE: 97.9 F | WEIGHT: 211.2 LBS | RESPIRATION RATE: 18 BRPM | OXYGEN SATURATION: 96 % | HEART RATE: 77 BPM | BODY MASS INDEX: 36.06 KG/M2 | HEIGHT: 64 IN | DIASTOLIC BLOOD PRESSURE: 72 MMHG

## 2025-05-13 DIAGNOSIS — G47.33 OSA (OBSTRUCTIVE SLEEP APNEA): ICD-10-CM

## 2025-05-13 DIAGNOSIS — E03.9 ACQUIRED HYPOTHYROIDISM: ICD-10-CM

## 2025-05-13 DIAGNOSIS — G89.4 CHRONIC PAIN SYNDROME: ICD-10-CM

## 2025-05-13 DIAGNOSIS — K59.09 CHRONIC CONSTIPATION: ICD-10-CM

## 2025-05-13 DIAGNOSIS — F41.9 ANXIETY: Primary | ICD-10-CM

## 2025-05-13 DIAGNOSIS — G89.29 CHRONIC BILATERAL THORACIC BACK PAIN: Primary | ICD-10-CM

## 2025-05-13 DIAGNOSIS — F41.9 ANXIETY: ICD-10-CM

## 2025-05-13 DIAGNOSIS — M54.6 CHRONIC BILATERAL THORACIC BACK PAIN: Primary | ICD-10-CM

## 2025-05-13 DIAGNOSIS — R33.9 URINARY RETENTION: ICD-10-CM

## 2025-05-13 DIAGNOSIS — I50.32 CHRONIC HEART FAILURE WITH PRESERVED EJECTION FRACTION (H): ICD-10-CM

## 2025-05-13 DIAGNOSIS — I10 BENIGN ESSENTIAL HYPERTENSION: ICD-10-CM

## 2025-05-13 DIAGNOSIS — R53.81 PHYSICAL DECONDITIONING: ICD-10-CM

## 2025-05-13 PROCEDURE — 99310 SBSQ NF CARE HIGH MDM 45: CPT | Performed by: NURSE PRACTITIONER

## 2025-05-13 RX ORDER — LACTULOSE 10 G/15ML
SOLUTION ORAL
COMMUNITY
Start: 2025-05-13

## 2025-05-13 RX ORDER — CLONAZEPAM 0.5 MG/1
0.5 TABLET ORAL 3 TIMES DAILY PRN
Qty: 30 TABLET | Refills: 0 | Status: SHIPPED | OUTPATIENT
Start: 2025-05-13

## 2025-05-13 NOTE — NURSING NOTE
"[facility]    Chief Complaint   Patient presents with    RECHECK     HPI:  Lisbet Sanchez is a 65 year old  (1960), who is being seen today for an episodic care visit at: [facility] [[id number]].     From Chart Review:      \"Pt has a PMH of chronic back pain s/p intrathecal pain pump placement with recent revision, obesity, chronic anxiety, HTN, HFpEF, chronic urinary incontinence, and chronic constipation.     On April 28 she was seen in a pain clinic and was found that the intrathecal pain pump had been kinked and she did have a procedure to repair this. She also has been noted to have bilateral 2-3 screw loosening, and may need to have fusion above the T7 level with screw removal and further fusion.     On 4/29 she was transferred from [facility] to [facility] for the evaluation of worsening back right lower extremity weakness, left groin pain, acute on chronic LLE pain, and weakness following revision of pain pump at her outpatient clinic. There was an initial concern for spine infection with sepsis, prompting hospitalization, imaging and neurosurgery evaluation.  Ultimately, picture was felt not to be consistent with infection; empiric antibiotics were discontinued.     Hospital course was complicated by toxic metabolic encephalopathy with hypoxia, felt secondary to medications. She maintained Klonopin and gabapentin prior to admission. Gabapentin dose was reduced to 300 mg QID, and Klonopin was discontinued during hospitalization and not resumed.  Hypoxia was felt to be secondary to underlying obstructive sleep apnea in the setting of pain medications and muscle relaxants.  Mental status and O2 saturations improved.    She was discharged on oxycodone 10 mg every 4 hours as needed which is her home dose, Robaxin 500 mg every 6 hours as needed for spasms, gabapentin 300 mg 4 times daily, and Lidocaine patch     She does have a history of acute on " "chronic urinary incontinence. Torres catheter was placed secondary to urinary retention during hospitalization and was not removed with recommendation for trial of voiding in the future when patient is more active.     Patient has a history of heart failure with ejection fraction over 75%, PTA on lisinopril and Bumex. These medications were held then resumed during hospitalization.     Chronic depression/anxiety was managed with prior to admission BuSpar, desvenlafaxine and aripiprazole.\"    Today:     Neurological Status:   The patient was evaluated while sitting upright in her wheelchair. She reports a steady improvement in overall mobility and strength. Specifically, she notes that weakness in her left lower extremity has improved and is now close to her baseline. She is currently transferring with assistance of one person and is participating in daily physical and occupational therapy sessions.    The only new neurologic concern is the presence of a fine right upper extremity essential tremor, noticeable when holding silverware. She states this began after her most recent hospitalization.    Pain Management:  Her pain is currently well controlled with a fentanyl intrathecal pain pump and po pain medications of gabapentin, methocarbamol, and oxycodone. Chronic pain is not significantly limiting her daily function, and she is working with therapies daily.    Bowel Function:  She reports having regular, soft, formed bowel movements every 2 to 3 days. She denies constipation or related symptoms.    Mental Health:  She reports experiencing acute on chronic anxiety and poor sleep. She is interested in seeing a mental health specialist during her stay at the TCU, and would like to be prescribed an as-needed medication for anxiety if possible.     Urinary Concerns:  The patient reports new urinary symptoms. Since approximately 10:00 AM today, she has observed urine bypassing her Torres catheter. She is also experiencing " "suprapubic tenderness. Additionally, she noted small clots of blood in her catheter bag starting yesterday. She does not take any blood thinners.    She denies a history of bladder spasms and does not believe the catheter is kinked. However, she states that daily catheter care has not been consistently performed by staff. She also reports that a nurse deflated and reinserted the same catheter this morning, but the issue of urinary bypassing has persisted.    Allergies, and PMH/PSH reviewed in [facility] today.    REVIEW OF SYSTEMS:    General: Negative for fever, chills, fatigue, weight loss/gain, and malaise  Head: Negative for headache  Neck: Negative for thyromegaly and lymphadenopathy   Respiratory: Negative for cough, dyspnea, and wheezing  Cardiovascular: Negative for chest pain, palpitations, orthopnea, and edema   Gastrointestinal: Negative for nausea, vomiting, diarrhea, constipation, abdominal pain, changes in appetite, and blood in stool   Genitourinary: Positive for suprapubic tenderness, aa few blood clots in the urine, and urine bypassing the catheter  Musculoskeletal: Positive for chronic joint pain and back pain. Negative for weakness  Neurological: Negative for dizziness, tingling, and weakness. Positive for fine RUE essential tremor.   Psychiatric: Positive for anxiety and mood changes. Negative for suicidal ideation.  Endocrine: Negative fore heat/cold intolerance, polyuria, polydipsia, hair/skin changes, and salt cravings.      Objective:   BP (!) 140/72   Pulse 77   Temp 97.9  F (36.6  C)   Resp 18   Ht 1.626 m (5' 4\")   Wt 95.8 kg (211 lb 3.2 oz)   SpO2 96%   BMI 36.25 kg/m      BP Readings from Last 3 Encounters:   05/13/25 (!) 140/72   05/08/25 (!) 149/68   05/06/25 (!) 154/78        PHYSICAL EXAM:     General: Pleasant, calm, cooperative, and in no acute distress. Has disheveled appearance and hair is unkempt  Neck: Supple, non-tender, full rnage of motion. No lymphadenopathy or " thyromegaly. No JVD.   Respiratory: Lungs clear to auscultation bilaterally. No wheezes, rales, or rhonchi.   Gastrointestinal. Soft, non-distended. Bowel sounds present in all 4 quadrants. No tenderness, rebound, or guarding.   Genitourinary: No brief in place. Perineal intertriginous areas are reddened and moist. Torres catheter is patent and un-kinked. Urine is cloudy and roshan in color with sediment and small blood clots present.   Musculoskeletal:   Spine: Hunched posture. Minimal spinous tenderness over the most recent surgical incision and paraspinous tenderness adjacent to this incision.   Upper Extremities: Normal range of motion of bilateral upper extremities. Strength 5/5 bilaterally. No obvious joint swelling present.   Lower Extremities: Deferred gait assessment. Able to normally flex and extend the knees. Able to normally dorsi- and plantar- flex the ankles. Quad and hamstring strength equal bilaterally against resistance.   Neurological: Alert and oriented to person, place, time, and situation. Cranial nerves II-XII grossly intact. Negative clonus. Biceps and patellar DTR's +2 and equal bilaterally.   Skin: Warm, dry. Small, lower spinal incision closed with skin glue and open to air with no erythema or drainage.  Psychiatric: Appropriate affect and mood. Speech clear and coherent.     Labs done in SNF are in [place]. Please refer to them using [employer] Everywhere., Recent labs in EPIC reviewed by me today. , and   Most Recent 3 CBC's:  Recent Labs   Lab Test 05/09/25  0605 03/10/25  0644 02/21/25  1107   WBC 6.1 5.4 4.8   HGB 11.0* 10.7* 11.5*   MCV 89 84 84    201 225     Most Recent 3 BMP's:  Recent Labs   Lab Test 05/09/25  0605 05/08/25  0623 03/13/25  0800    142 139   POTASSIUM 4.6 4.1 4.4   CHLORIDE 101 101 100   CO2 26 32* 30*   BUN 14.0 17.3 11.4   CR 0.70 0.70 0.73   ANIONGAP 14 9 9   MARIELY 9.4 9.2 9.7   GLC 96 102* 94     Most Recent TSH and T4:  Recent Labs   Lab Test  02/21/25  1107   TSH 0.56     Most Recent Hemoglobin A1c: No lab results found.      Assessment/Plan:    Acute on chronic pain  Chronic bilateral lower extremity weakness, LLE > RLE   S/p intrathecal pump revision  Pain control is now stable and there are no acute concerns of weakness. In fact, upon exam, BLE strength was found to be equal.   Plan:   Follow-up with pain clinic regarding intrathecal catheter - next appointment on 5/15.   Continue reduced dose of gabapentin, muscle relaxants, and as needed oxycodone   Clarify with [facility] stop date of clindamycin     Complex spine history as noted above with history of cervical thoracic and lumbar spine fusion  Outpatient notes indicate possible need for further thoracic spine fusion with removal of screws  Plan:   Continue therapies.  Pain medication regimen as above.    Follow-up with pain team and surgery    Concern for acute simple Cystitis   Afebrile, but has signs and symptoms of lower urinary tract infection which include suprapubic tenderness and cloudy urine with sediment and small blood clots present. May be catheter-associated UTI or asymptomatic bacteruria.    Plan:   Order CBC  Exchange catheter and obtain UA/UC  Initiate antibiotics if urine culture and sensitivity is positive for infection. However, if patient become febrile, then start empiric abx.   Patient wants to be independent with transfers prior to removal of farah, however, consider TOV earlier to reduce UTI risk.   Continue daily catheter cares.     WILMAN  On multiple medications as noted above. Of note, clonazepam was discontinued during hospitalization due to acute encephalopathy with hypoxia. Patient has been without this medication for approx 2 weeks, and there have been no concerns of withdrawal symptoms.   Plan:  Continue scheduled aripiprazole, buspirone, desvenlafaxine, and hydroxyzine  PRN hydroxyzine doses also available for as needed anxiety  Ok for house psychologist to  follow    Urinary retention in the setting of acute pain and immobility   History of chronic urinary incontinence for which the patient was prescribed oxybutynin. Oxybutynin currently held given current issue with urinary retention.   Plan:   Manage constipation with scheduled lactulose.   Consider trial of voiding when patient is more ambulatory.     History of heart failure with preserved ejection fraction  On chronic Bumex, statin, lisinopril, metoprolol. Patient appears euvolemic  Plan:   Monitor for signs of fluid overload  Monitor blood pressure  BMP  Weigh Monday, Wednesday, and Friday     History of migraine headaches  Plan: Continue Relpax as needed     Hypothyroidism  TSH 0.56 in February 2025. No need to recheck TSH at this time.   Plan:   Continue PTA levothyroxine.     Electronically signed by: Soo Kumari, RN, BSN AGNP Student

## 2025-05-13 NOTE — PROGRESS NOTES
[facility] GERIATRICS    Chief Complaint   Patient presents with    RECHECK     HPI:  Lisbet Sanchez is a 65 year old  (1960), who is being seen today for an episodic care visit at: [facility] ([facility]) [[id number]].     PMH: cervical spine arthroplasty and fusion in 2022, lumbar spinal stenosis with radiculopathy status post failed spinal fusion (1/2024) with fractured right sided cleveland at L1 and subsequent revision in 10/2024 with Dr. Moore, followed by multiple admissions and visits for mechanical falls precipitated by recurrent episodes of acute onset right leg weakness.     Seen by Dr. Pond on 4/28 in clinic--her note indicates that patient has an intrathecal pain pump which was kinked and apparently patient had surgery on 4/29 to repair this with Dr. Atkins ([facility]). Her note also indicates that patient had a recent CT myelogram and MRI which revealed bilateral T2 screw loosening, thus her recommendation was to revise the fusion above the level of T7 bilaterally and remove the T2 screws, extending fusion upward to C4--scheduled for later this month.  Patient was subsequently admitted to [facility] with worsening right leg weakness and pain. Seen by spine surgery at that hospital, CT revealed stable extensive postsurgical changes of the thoracolumbar spine and the sacrum. That note indicates that patient's spine has been extensively imaged without acute findings to explain her weakness primary team admitted with spine has not recommended any further surgical intervention.     Admitted to [facility] 4/30-5/5/25 due to RLE weakness and needing MRI. Started on ertapenem and vancomycin due to initial concern for sepsis. NSSHANON (Dr. Pond) consulted, did not recommend continuing steroids and recommended MRI thoracic and lumbar spine. Nonspecific dorsal subcutaneous fluid collection at L2-L4 measuring 20 x 37 x 71 mm - d/w NSGY and do  "not feel this is contributing to her weakness.    Also noted to have acute urinary retention and farah catheter was placed.      Transferred to [facility] on 5/5/25.       Today's concern is:     During exam, patient seen sitting in wheelchair.       RLE weakness resolved.   Intermittent tremor/spasm to R hand w/holding coffee mug x 2 weeks.   Has upcoming surgery, removing three screws and cervical to lumbar fusion at [facility] with Neurosurgery, [place].        Does endorse suprapubic pressure at times.         Allergies, and PMH/PSH reviewed in [facility] today.    REVIEW OF SYSTEMS:  {krspqc71:544258}      Objective:   BP (!) 140/72   Pulse 77   Temp 97.9  F (36.6  C)   Resp 18   Ht 1.626 m (5' 4\")   Wt 95.8 kg (211 lb 3.2 oz)   SpO2 96%   BMI 36.25 kg/m    {Nursing home physical exam :508837}    Wt Readings from Last 4 Encounters:   05/13/25 95.8 kg (211 lb 3.2 oz)   05/08/25 94.4 kg (208 lb 3.2 oz)   05/06/25 94.4 kg (208 lb 3.2 oz)   03/12/25 93.6 kg (206 lb 6.4 oz)       Recent labs in [facility] reviewed by me today.    Most Recent 3 CBC's:  Recent Labs   Lab Test 05/09/25  0605 03/10/25  0644 02/21/25  1107   WBC 6.1 5.4 4.8   HGB 11.0* 10.7* 11.5*   MCV 89 84 84    201 225     Most Recent 3 BMP's:  Recent Labs   Lab Test 05/09/25  0605 05/08/25  0623 03/13/25  0800    142 139   POTASSIUM 4.6 4.1 4.4   CHLORIDE 101 101 100   CO2 26 32* 30*   BUN 14.0 17.3 11.4   CR 0.70 0.70 0.73   ANIONGAP 14 9 9   MARIELY 9.4 9.2 9.7   GLC 96 102* 94         Assessment/Plan:    {FGS DX2:421202}      - Restart clonazepam 0.5mg TID PRN dx anxiety  - Ok for house psychologist to follow      Seen by Dr. Pond on 4/28 in clinic--her note indicates that patient has an intrathecal pain pump which was kinked and apparently patient had surgery on 4/29 to repair this. Her note also indicates that patient had a recent CT myelogram and MRI which revealed bilateral T2 screw loosening, thus her recommendation was to revise " the fusion above the level of T7 bilaterally and remove the T2 screws, extending fusion upward to C4--scheduled for later this month.   - Follow-up with Dr. Pond on *** for   #[phone]      Clindamycin stop date? Need clarification from [facility]. Next appt on 5/15/25.       CHF w/preserved EF, HTN.   PTA bumex, lisinopril and metoprolol held during hospital stay due to hypotension; restarted upon discharge.       Urinary retention        Orders:  - Ok for house psychologist to follow  - Clarify with [facility] stop date of clindamycin, next appointment on 5/15/25.   - Add klonopin 0.5mg TID PRN dx anxiety  - Please call Dr. Pond (Neurosurgeon) clinic (#[phone]) to clarify date of upcoming surgery and obtain pre-op information      [phone]    Total time spent during today's visit was *** mins including patient visit and review of past records. Time also spent coordinating care with ***.     Electronically signed by: MELODY Jacobsen CNP        directed; #[phone]  - Continue PT, OT  - SW following for discharge planning. Goal is to discharge home.    (F41.9) Anxiety  Comment: Chronic anxiety. PTA klonopin held due to encephalopathy, now resolved.  Plan:   - Restart clonazepam 0.5mg TID PRN dx anxiety  - Continue buspirone, desvenlafaxine, aripiprazole   - Ok for house psychologist to follow          (G47.33) KEITH (obstructive sleep apnea)  Comment: Chronic, CPAP at home needs to be fixed  Plan:   - Monitor O2 sats  - Follow-up with Sleep Clinic for new CPAP machine    (R33.9) Urinary retention  Comment: Acute urinary retention, required farah placement during hospital stay. Has bladder stimulator in place. Oxybutynin dc'd due to urinary retention.   Plan:   - Monitor urine output  - Consider voiding trial end of week    (I50.32) Chronic heart failure with preserved ejection fraction (H)  (I10) Benign essential hypertension  Comment: Chronic CHF w/preserved EF, HTN. PTA bumex, lisinopril and metoprolol held during hospital stay due to hypotension; restarted upon discharge.   Plan:   - Continue bumex, lisionpril    (E03.9) Acquired hypothyroidism  Comment: Chronic  Plan:   - Continue levothyroxine      Orders:  - Ok for house psychologist to follow  - Clarify with [facility] stop date of clindamycin, next appointment on 5/15/25.   - Add klonopin 0.5mg TID PRN dx anxiety  - Please call Dr. Pond (Neurosurgeon) clinic (#[phone]) to clarify date of upcoming surgery and obtain pre-op information        Total time spent during today's visit was 52 mins including patient visit ([phone]; 32 minutes) and review of past records (20 minutes).     Electronically signed by: MELODY Jacobsen CNP

## 2025-05-13 NOTE — PATIENT INSTRUCTIONS
[facility]iatric[facility]   May 13, 2025     Name: Lisbet Sanchez   : 1960       Orders:  - Ok for house psychologist to follow  - Clarify with [facility] stop date of clindamycin, next appointment on 5/15/25.   - Add klonopin 0.5mg TID PRN dx anxiety  - Please call Dr. Pond (Neurosurgeon) clinic (#[phone]) to clarify date of upcoming surgery and obtain pre-op information      Electronically signed by  MELODY Jacobsen CNP on 2025 at 5:21 PM

## 2025-05-15 ENCOUNTER — LAB REQUISITION (OUTPATIENT)
Dept: LAB | Facility: CLINIC | Age: 65
End: 2025-05-15
Payer: COMMERCIAL

## 2025-05-15 ENCOUNTER — TELEPHONE (OUTPATIENT)
Dept: GERIATRICS | Facility: CLINIC | Age: 65
End: 2025-05-15
Payer: COMMERCIAL

## 2025-05-15 VITALS
WEIGHT: 212.8 LBS | DIASTOLIC BLOOD PRESSURE: 96 MMHG | OXYGEN SATURATION: 94 % | SYSTOLIC BLOOD PRESSURE: 148 MMHG | RESPIRATION RATE: 16 BRPM | HEIGHT: 64 IN | HEART RATE: 90 BPM | BODY MASS INDEX: 36.33 KG/M2 | TEMPERATURE: 96.7 F

## 2025-05-15 DIAGNOSIS — R31.0 GROSS HEMATURIA: ICD-10-CM

## 2025-05-15 LAB
ALBUMIN UR-MCNC: NEGATIVE MG/DL
APPEARANCE UR: CLEAR
BILIRUB UR QL STRIP: NEGATIVE
COLOR UR AUTO: ABNORMAL
GLUCOSE UR STRIP-MCNC: NEGATIVE MG/DL
HGB UR QL STRIP: ABNORMAL
HYALINE CASTS: 2 /LPF
KETONES UR STRIP-MCNC: NEGATIVE MG/DL
LEUKOCYTE ESTERASE UR QL STRIP: NEGATIVE
NITRATE UR QL: NEGATIVE
PH UR STRIP: 5.5 [PH] (ref 5–7)
RBC URINE: 2 /HPF
SP GR UR STRIP: 1 (ref 1–1.03)
UROBILINOGEN UR STRIP-MCNC: NORMAL MG/DL
WBC URINE: 3 /HPF

## 2025-05-15 PROCEDURE — 81003 URINALYSIS AUTO W/O SCOPE: CPT | Performed by: NURSE PRACTITIONER

## 2025-05-15 NOTE — CONFIDENTIAL NOTE
RN called to report new onset of hematuria. Patient has chronic indwelling catheter. No pain noted and without s/symptom of infection.     Plan:  Ok to replace catheter and obtain UA/UC  Monitor bleeding and update PCP in AM. Unable to get CBC today.       Leslie Hunt, NP

## 2025-05-15 NOTE — PROGRESS NOTES
"[facility] GERIATRICS    Chief Complaint   Patient presents with    RECHECK     HPI:  Lisbet Sanchez is a 65 year old  (1960), who is being seen today for an episodic care visit at: [facility] ([facility]) [[id number]]. Today's concern is: ***    Allergies, and PMH/PSH reviewed in [facility] today.  REVIEW OF SYSTEMS:  {owspjh09:498057}    Objective:   BP (!) 148/96   Pulse 90   Temp (!) 96.7  F (35.9  C)   Resp 16   Ht 1.626 m (5' 4\")   Wt 96.5 kg (212 lb 12.8 oz)   SpO2 94%   BMI 36.53 kg/m    {Nursing home physical exam :179793}    {fgslab:658498}    Assessment/Plan:  {S DX2:510124}    MED REC REQUIRED{TIP  Click the link below to document or use med rec list, use list to pull in response :795773}  Post Medication Reconciliation Status: {MED REC LIST:256806}      Orders:  {fgsorders:483476}  ***    Electronically signed by: Natividad Woodson ***      " 05/09/25  0605 03/10/25  0644 02/21/25  1107   WBC 6.1 5.4 4.8   HGB 11.0* 10.7* 11.5*   MCV 89 84 84    201 225     Most Recent 3 BMP's:  Recent Labs   Lab Test 05/09/25  0605 05/08/25  0623 03/13/25  0800    142 139   POTASSIUM 4.6 4.1 4.4   CHLORIDE 101 101 100   CO2 26 32* 30*   BUN 14.0 17.3 11.4   CR 0.70 0.70 0.73   ANIONGAP 14 9 9   MARIELY 9.4 9.2 9.7   GLC 96 102* 94       Assessment/Plan:    (M54.6,  G89.29) Chronic bilateral thoracic back pain  (primary encounter diagnosis)  (G89.4) Chronic pain syndrome  (R53.81) Physical deconditioning  (K59.09) Chronic constipation  Comment: Chronic pain syndrome w/thoracic and lumbar pain.   Seen by Dr. Pond on 4/28 in clinic--her note indicates that patient has an intrathecal pain pump which was kinked and apparently patient had surgery on 4/29 to repair this with Dr. Atkins ([facility]). Her note also indicates that patient had a recent CT myelogram and MRI which revealed bilateral T2 screw loosening, thus her recommendation was to revise the fusion above the level of T7 bilaterally and remove the T2 screws, extending fusion upward to C4--scheduled for later this month.  Chronic constipation w/chronic opioid use.   Ongoing physical deconditioning.   Last therapy update:   Transfer SBA  Bed Mob SBA  Ambulation 100' with 2WW SBA- CGA  SLUMS 24/30  UB dressing Set up  LB Dressing assist with Catheter  Plan:   - RN called Dr. Pond clinic, confirmed no surgery date scheduled at this time  - Continue tylenol, oxycodone, gabapentin, robaxin PRN  - Continue lactulose  - Has intrathecal pain pump  - Seen by [facility] on 5/15, dc'd clindamycin   - Follow-up with Dr. Pond as directed; #[phone]  - Continue PT, OT  - SW following for discharge planning. Goal is to discharge home.    (R33.9) Urinary retention  Comment: Acute urinary retention, required farah placement during hospital stay. Has bladder stimulator in place. Oxybutynin dc'd  due to urinary retention.   Plan:   - Remove catheter on 5/17/25 at 0900 and start voiding trial. Check PVR TID, if PVR > 300cc then straight cath. If straight cath x 2, then place farah catheter. Dx urinary retention        Orders:  - Remove catheter on 5/17/25 at 0900 and start voiding trial. Check PVR TID, if PVR > 300cc then straight cath. If straight cath x 2, then place farah catheter. Dx urinary retention      Electronically signed by: MELODY Jacobsen CNP

## 2025-05-16 ENCOUNTER — TRANSITIONAL CARE UNIT VISIT (OUTPATIENT)
Dept: GERIATRICS | Facility: CLINIC | Age: 65
End: 2025-05-16
Payer: COMMERCIAL

## 2025-05-16 DIAGNOSIS — M54.6 CHRONIC BILATERAL THORACIC BACK PAIN: Primary | ICD-10-CM

## 2025-05-16 DIAGNOSIS — G89.29 CHRONIC BILATERAL THORACIC BACK PAIN: Primary | ICD-10-CM

## 2025-05-16 DIAGNOSIS — R33.9 URINARY RETENTION: ICD-10-CM

## 2025-05-16 DIAGNOSIS — G89.4 CHRONIC PAIN SYNDROME: ICD-10-CM

## 2025-05-16 DIAGNOSIS — R53.81 PHYSICAL DECONDITIONING: ICD-10-CM

## 2025-05-16 PROCEDURE — 99309 SBSQ NF CARE MODERATE MDM 30: CPT | Performed by: NURSE PRACTITIONER

## 2025-05-16 NOTE — PATIENT INSTRUCTIONS
[facility]   May 16, 2025     Name: Lisbet Sanchez   : 1960       Orders:  - Remove catheter on 25 at 0900 and start voiding trial. Check PVR TID, if PVR > 300cc then straight cath. If straight cath x 2, then place farah catheter. Dx urinary retention      Electronically signed by  MELODY Jacobsen CNP on 2025 at 3:25 PM

## 2025-05-17 DIAGNOSIS — R52 SEVERE PAIN: ICD-10-CM

## 2025-05-17 RX ORDER — OXYCODONE HYDROCHLORIDE 10 MG/1
10 TABLET ORAL EVERY 4 HOURS PRN
Qty: 24 TABLET | Refills: 0 | Status: SHIPPED | OUTPATIENT
Start: 2025-05-17 | End: 2025-05-19

## 2025-05-19 ENCOUNTER — LAB REQUISITION (OUTPATIENT)
Dept: LAB | Facility: CLINIC | Age: 65
End: 2025-05-19
Payer: COMMERCIAL

## 2025-05-19 DIAGNOSIS — R42 DIZZINESS AND GIDDINESS: ICD-10-CM

## 2025-05-19 DIAGNOSIS — I10 BENIGN ESSENTIAL HYPERTENSION: Primary | ICD-10-CM

## 2025-05-19 DIAGNOSIS — G89.4 CHRONIC PAIN SYNDROME: Primary | ICD-10-CM

## 2025-05-19 DIAGNOSIS — I10 ESSENTIAL (PRIMARY) HYPERTENSION: ICD-10-CM

## 2025-05-19 RX ORDER — OXYCODONE HYDROCHLORIDE 10 MG/1
10 TABLET ORAL EVERY 4 HOURS PRN
Qty: 60 TABLET | Refills: 0 | Status: SHIPPED | OUTPATIENT
Start: 2025-05-19

## 2025-05-19 RX ORDER — LISINOPRIL 5 MG/1
5 TABLET ORAL DAILY
Qty: 30 TABLET | Refills: 0 | Status: SHIPPED | OUTPATIENT
Start: 2025-05-19

## 2025-05-19 NOTE — PROGRESS NOTES
[facility] GERIATRICS DISCHARGE SUMMARY    PATIENT'S NAME: Lisbet Sanchez  YOB: 1960  MEDICAL RECORD NUMBER:  [id number]  Place of Service where encounter took place:  [facility] ([facility]) [[id number]]    PRIMARY CARE PROVIDER AND CLINIC RESPONSIBLE AFTER TRANSFER:   Nubia Suárez MD   Non-[facility] Provider     Transferring providers: MELODY Jacobsen CNP; Romain Bartholomew MD  Recent Hospitalization/ED:  [facility] stay 4/30/25 to 5/5/25.  Date of SNF Admission: 5/5/25  Date of SNF (anticipated) Discharge: 5/23/25  Discharged to: previous independent home  Cognitive Scores: SLUMS 24/30  Physical Function:    Transfer SBA  Ambulation 50' with RW  SBA-CGA  UB dressing Mod I  LB dressing SBA  Toileting Sup  DME: Walker    CODE STATUS/ADVANCE DIRECTIVES DISCUSSION:  Full Code   ALLERGIES: Cephalexin, Cephalosporins, and Vancomycin    NURSING FACILITY COURSE     Medication Changes/Rationale:   - Decrease bumex to 0.5mg every day dx CHF  - Decreased lisinopril to 5mg every day due to hypotension      Summary of nursing facility stay:     PMH: cervical spine arthroplasty and fusion in 2022, lumbar spinal stenosis with radiculopathy status post failed spinal fusion (1/2024) with fractured right sided cleveland at L1 and subsequent revision in 10/2024 with Dr. Moore, followed by multiple admissions and visits for mechanical falls precipitated by recurrent episodes of acute onset right leg weakness.      Seen by Dr. Pond on 4/28 in clinic--her note indicates that patient has an intrathecal pain pump which was kinked and apparently patient had surgery on 4/29 to repair this with Dr. Atkins ([facility]). Her note also indicates that patient had a recent CT myelogram and MRI which revealed bilateral T2 screw loosening, thus her recommendation was to revise the fusion above the level of T7 bilaterally and remove the T2 screws, extending fusion  upward to C4--scheduled for later this month.  Patient was subsequently admitted to [facility] with worsening right leg weakness and pain. Seen by spine surgery at that hospital, CT revealed stable extensive postsurgical changes of the thoracolumbar spine and the sacrum. That note indicates that patient's spine has been extensively imaged without acute findings to explain her weakness primary team admitted with spine has not recommended any further surgical intervention.      Admitted to [facility] 4/30-5/5/25 due to RLE weakness and needing MRI. Started on ertapenem and vancomycin due to initial concern for sepsis. NSGY (Dr. Pond) consulted, did not recommend continuing steroids and recommended MRI thoracic and lumbar spine. Nonspecific dorsal subcutaneous fluid collection at L2-L4 measuring 20 x 37 x 71 mm - d/w NSGY and do not feel this is contributing to her weakness.    Also noted to have acute urinary retention and farah catheter was placed.       Transferred to [facility] on 5/5/25.       (M54.6,  G89.29) Chronic bilateral thoracic back pain  (primary encounter diagnosis)  (G89.4) Chronic pain syndrome  (R53.81) Physical deconditioning  (K59.09) Chronic constipation  Comment: Chronic pain syndrome w/thoracic and lumbar pain.   Seen by Dr. oPnd on 4/28 in clinic--her note indicates that patient has an intrathecal pain pump which was kinked and apparently patient had surgery on 4/29 to repair this with Dr. Atkins ([facility]). Her note also indicates that patient had a recent CT myelogram and MRI which revealed bilateral T2 screw loosening, thus her recommendation was to revise the fusion above the level of T7 bilaterally and remove the T2 screws, extending fusion upward to C4--scheduled for later this month.  Completed course of clindamycin on 5/15/25.   Chronic constipation w/chronic opioid use.   Ongoing physical deconditioning. Has 6-7 stairs at home, need to  complete in therapy prior to discharging home.   Plan:   - Continue tylenol, oxycodone PRN, gabapentin, robaxin PRN  - Continue lactulose  - Has intrathecal pain pump  - Follow-up with Dr. Pond as directed; #[phone]   - Follow-up with [facility] as directed  - Patient discharging home w/family, as well as [facility] home care services, including home PT, OT.    (R33.9) Urinary retention  Comment: Acute urinary retention, required farah placement during hospital stay. Has bladder stimulator in place. Oxybutynin dc'd due to urinary retention.   Farah catheter removed on 5/17/25, voiding trial successful.   Plan:   - Monitor symptoms    (I50.32) Chronic heart failure with preserved ejection fraction (H)  (I10) Benign essential hypertension  Comment: Chronic CHF w/preserved EF, HTN. PTA bumex, lisinopril and metoprolol held during hospital stay due to hypotension; restarted upon discharge. Intermittent dizziness in the morning with intermittent episodes of hypotension.   Dry weight 208-211lbs.  Plan:   - Decreased bumex to 0.5mg every day and decreased lisinopril to 5mg every day   - Monitor weights, BP/HR  - Follow-up with Cardiology as directed    (F41.9) Anxiety  Comment: Chronic anxiety.   Plan:   - Continue clonazepam PRN; restarted during TCU stay  - Continue buspirone, desvenlafaxine, aripiprazole     (G47.33) KEITH (obstructive sleep apnea)  Comment: Chronic, CPAP at home needs to be fixed  Plan:   - Monitor O2 sats  - Follow-up with Sleep Clinic for new CPAP machine    (E03.9) Acquired hypothyroidism  Comment: Chronic  Plan:   - Continue levothyroxine      Discharge Medications:    MED REC REQUIRED  Post Medication Reconciliation Status: discharge medications reconciled and changed, per note/orders     Current Outpatient Medications   Medication Sig Dispense Refill    ARIPiprazole (ABILIFY) 30 MG tablet Take 30 mg by mouth daily.      artificial saliva (BIOTENE MT) SOLN solution Swish and spit 15 mLs  in mouth every 4 hours as needed for dry mouth.      atorvastatin (LIPITOR) 10 MG tablet Take 10 mg by mouth daily.      bumetanide (BUMEX) 0.5 MG tablet Take 0.5 mg by mouth 2 times daily.      busPIRone HCl (BUSPAR) 30 MG tablet Take 30 mg by mouth 2 times daily.      Calcium Carb-Cholecalciferol (CALCIUM 500 + D) 500-5 MG-MCG TABS Take 1 tablet by mouth daily.      clonazePAM (KLONOPIN) 0.5 MG tablet Take 1 tablet (0.5 mg) by mouth 3 times daily as needed for anxiety. 30 tablet 0    Cranberry 450 MG TABS Take 450 mg by mouth daily.      desvenlafaxine (PRISTIQ) 100 MG 24 hr tablet Take 100 mg by mouth daily.      eletriptan (RELPAX) 20 MG tablet Take 20 mg by mouth 2 times daily as needed for migraine.      gabapentin (GRALISE) 300 MG 24 hr tablet Take 300 mg by mouth 4 times daily.      hydrOXYzine HCl (ATARAX) 25 MG tablet Take 1 tablet (25 mg) by mouth 2 times daily. May also take 1 tablet (25 mg) 2 times daily as needed for itching. 60 tablet 0    lactulose (CHRONULAC) 10 GM/15ML solution Take 22.5 mLs (15 g) by mouth daily. May also take 22.5 mLs (15 g) daily as needed for constipation.      levothyroxine (SYNTHROID/LEVOTHROID) 175 MCG tablet Take 175 mcg by mouth daily.      lisinopril (ZESTRIL) 5 MG tablet Take 1 tablet (5 mg) by mouth daily. 30 tablet 0    magnesium oxide (MAG-OX) 400 MG tablet Take 400 mg by mouth 2 times daily as needed.      methocarbamol (ROBAXIN) 500 MG tablet Take 500 mg by mouth every 6 hours as needed for muscle spasms.      metoprolol succinate ER (TOPROL XL) 25 MG 24 hr tablet Take 12.5 mg by mouth daily.      multivitamin (CENTRUM SILVER) tablet Take 1 tablet by mouth daily.      naloxone (NARCAN) 4 MG/0.1ML nasal spray Spray 4 mg into one nostril alternating nostrils as needed for opioid reversal. every 2-3 minutes until assistance arrives      ondansetron (ZOFRAN ODT) 4 MG ODT tab Take 4 mg by mouth every 8 hours as needed for nausea.      oxyCODONE IR (ROXICODONE) 10 MG  "tablet Take 1 tablet (10 mg) by mouth every 4 hours as needed for severe pain. 60 tablet 0    pseudoePHEDrine (SUDAFED) 60 MG tablet Take 120 mg by mouth daily as needed for congestion.          Controlled medications:   Medication: oxycodone 10mg , 30 tabs given to patient at the time of discharge to take home       Past Medical History: No past medical history on file.  Physical Exam:   Vitals: /52   Pulse 81   Temp 98  F (36.7  C)   Resp 18   Ht 1.626 m (5' 4\")   Wt 96 kg (211 lb 9.6 oz)   SpO2 96%   BMI 36.32 kg/m    BMI: Body mass index is 36.32 kg/m .  GENERAL APPEARANCE:  Alert, in no distress, appears healthy, oriented, cooperative  RESP:  respiratory effort and palpation of chest normal, lungs clear to auscultation , no respiratory distress  CV:  regular rate and rhythm, no murmur, rub, or gallop, no edema  : Torres intact w/adequate output  M/S:   Gait and station abnormal , resting in bed. Ambulates w/walker.   SKIN:  Inspection of skin and subcutaneous tissue baseline, Palpation of skin and subcutaneous tissue baseline  NEURO:   Cranial nerves 2-12 are normal tested and grossly at patient's baseline, Examination of sensation by touch normal  PSYCH:  oriented X 3, affect and mood normal     Wt Readings from Last 4 Encounters:   05/20/25 96 kg (211 lb 9.6 oz)   05/15/25 96.5 kg (212 lb 12.8 oz)   05/13/25 95.8 kg (211 lb 3.2 oz)   05/08/25 94.4 kg (208 lb 3.2 oz)       SNF labs: Recent labs in [facility] reviewed by me today.    Most Recent 3 CBC's:  Recent Labs   Lab Test 05/09/25  0605 03/10/25  0644 02/21/25  1107   WBC 6.1 5.4 4.8   HGB 11.0* 10.7* 11.5*   MCV 89 84 84    201 225     Most Recent 3 BMP's:  Recent Labs   Lab Test 05/20/25  0612 05/09/25  0605 05/08/25  0623    141 142   POTASSIUM 3.8 4.6 4.1   CHLORIDE 100 101 101   CO2 31* 26 32*   BUN 12.9 14.0 17.3   CR 0.76 0.70 0.70   ANIONGAP 9 14 9   MARIELY 9.1 9.4 9.2   GLC 88 96 102*         Liver Function Studies -   Recent " Labs   Lab Test 03/10/25  0644   PROTTOTAL 6.2*   ALBUMIN 3.7   BILITOTAL 0.2   ALKPHOS 93   AST 23   ALT 21         DISCHARGE PLAN:    Follow up labs:   - Recheck BMP in 2-4 weeks with PCP due to intermittent dizziness w/hypotension      Medical Follow Up:      Follow up with primary care provider in 1-2 weeks  Follow up with specialist:   Follow-up with Sleep Clinic for new CPAP machine   Follow-up with Dr. Pond ([facility]  Cardiology       Discharge Services: Home Care:  Occupational Therapy and Physical Therapy      Discharge Instructions Verbalized to Patient at Discharge:   Weigh yourself daily in the morning and keep a record. Call your primary clinic: a) if you are more short of breath, or b) if your weight changes more than 3 pounds in one day or more than 5 pounds in one week.   Notify PCP if you have a fever greater than 100.5 degrees.           TOTAL DISCHARGE TIME:   Greater than 30 minutes    Electronically signed by:  MELODY Jacobsen CNP

## 2025-05-19 NOTE — CONFIDENTIAL NOTE
[facility]   May 19, 2025     Name: Lisbet Sanchez   : 1960     Background:  Patient reported dizziness 1-1.5hrs after morning medications over the past few days.       Orders:  - Check orthostatic vital signs on 25 AM shift; update provider with results   - Check BMP, Mg on 25 dx HTN, dizziness   - Decrease lisinopril to 5mg every day, hold if SBP < 120 dx HTN  - Add to metoprolol: Hold if SBP < 120 or HR < 60 dx HTN      Electronically signed by  MELODY Jacobsen CNP on 2025 at 4:08 PM       Not well controlled but improving  Continue same  Will continue to monitor

## 2025-05-20 ENCOUNTER — DISCHARGE SUMMARY NURSING HOME (OUTPATIENT)
Dept: GERIATRICS | Facility: CLINIC | Age: 65
End: 2025-05-20
Payer: COMMERCIAL

## 2025-05-20 VITALS
BODY MASS INDEX: 36.12 KG/M2 | OXYGEN SATURATION: 96 % | SYSTOLIC BLOOD PRESSURE: 115 MMHG | WEIGHT: 211.6 LBS | HEART RATE: 81 BPM | HEIGHT: 64 IN | RESPIRATION RATE: 18 BRPM | DIASTOLIC BLOOD PRESSURE: 52 MMHG | TEMPERATURE: 98 F

## 2025-05-20 DIAGNOSIS — G89.29 CHRONIC BILATERAL THORACIC BACK PAIN: Primary | ICD-10-CM

## 2025-05-20 DIAGNOSIS — G89.4 CHRONIC PAIN SYNDROME: ICD-10-CM

## 2025-05-20 DIAGNOSIS — G47.33 OSA (OBSTRUCTIVE SLEEP APNEA): ICD-10-CM

## 2025-05-20 DIAGNOSIS — E03.9 ACQUIRED HYPOTHYROIDISM: ICD-10-CM

## 2025-05-20 DIAGNOSIS — F41.9 ANXIETY: ICD-10-CM

## 2025-05-20 DIAGNOSIS — K59.09 CHRONIC CONSTIPATION: ICD-10-CM

## 2025-05-20 DIAGNOSIS — R53.81 PHYSICAL DECONDITIONING: ICD-10-CM

## 2025-05-20 DIAGNOSIS — I10 BENIGN ESSENTIAL HYPERTENSION: ICD-10-CM

## 2025-05-20 DIAGNOSIS — I50.32 CHRONIC HEART FAILURE WITH PRESERVED EJECTION FRACTION (H): ICD-10-CM

## 2025-05-20 DIAGNOSIS — R33.9 URINARY RETENTION: ICD-10-CM

## 2025-05-20 DIAGNOSIS — M54.6 CHRONIC BILATERAL THORACIC BACK PAIN: Primary | ICD-10-CM

## 2025-05-20 LAB
ANION GAP SERPL CALCULATED.3IONS-SCNC: 9 MMOL/L (ref 7–15)
BUN SERPL-MCNC: 12.9 MG/DL (ref 8–23)
CALCIUM SERPL-MCNC: 9.1 MG/DL (ref 8.8–10.4)
CHLORIDE SERPL-SCNC: 100 MMOL/L (ref 98–107)
CREAT SERPL-MCNC: 0.76 MG/DL (ref 0.51–0.95)
EGFRCR SERPLBLD CKD-EPI 2021: 86 ML/MIN/1.73M2
GLUCOSE SERPL-MCNC: 88 MG/DL (ref 70–99)
HCO3 SERPL-SCNC: 31 MMOL/L (ref 22–29)
MAGNESIUM SERPL-MCNC: 2.1 MG/DL (ref 1.7–2.3)
POTASSIUM SERPL-SCNC: 3.8 MMOL/L (ref 3.4–5.3)
SODIUM SERPL-SCNC: 140 MMOL/L (ref 135–145)

## 2025-05-20 PROCEDURE — 82947 ASSAY GLUCOSE BLOOD QUANT: CPT | Performed by: NURSE PRACTITIONER

## 2025-05-20 PROCEDURE — 80051 ELECTROLYTE PANEL: CPT | Performed by: NURSE PRACTITIONER

## 2025-05-20 PROCEDURE — 36415 COLL VENOUS BLD VENIPUNCTURE: CPT | Performed by: NURSE PRACTITIONER

## 2025-05-20 PROCEDURE — 80048 BASIC METABOLIC PNL TOTAL CA: CPT | Performed by: NURSE PRACTITIONER

## 2025-05-20 PROCEDURE — 99316 NF DSCHRG MGMT 30 MIN+: CPT | Performed by: NURSE PRACTITIONER

## 2025-05-20 PROCEDURE — 83735 ASSAY OF MAGNESIUM: CPT | Performed by: NURSE PRACTITIONER

## 2025-05-20 PROCEDURE — P9604 ONE-WAY ALLOW PRORATED TRIP: HCPCS | Performed by: NURSE PRACTITIONER

## 2025-05-20 PROCEDURE — 84132 ASSAY OF SERUM POTASSIUM: CPT | Performed by: NURSE PRACTITIONER

## 2025-05-20 RX ORDER — BUMETANIDE 0.5 MG/1
0.5 TABLET ORAL DAILY
Qty: 30 TABLET | Refills: 0 | Status: SHIPPED | OUTPATIENT
Start: 2025-05-20

## 2025-05-20 NOTE — PATIENT INSTRUCTIONS
[facility]   May 20, 2025     Name: Lisbet Sanchez   : 1960       Orders:  - Decrease bumex to 0.5mg every day dx CHF      Electronically signed by   MELODY Jacobsen CNP on 2025 at 3:48 PM

## 2025-05-20 NOTE — LETTER
5/20/2025      Lisbet Sanchez  [address] [place] [id number]        [facility] GERIATRICS    Chief Complaint   Patient presents with    RECHECK     HPI:  Lisbet Sanchez is a 65 year old  (1960), who is being seen today for an episodic care visit at: No question data found.. Today's concern is: ***    Allergies, and PMH/PSH reviewed in [facility] today.  REVIEW OF SYSTEMS:  {zqhwzo70:572400}    Objective:   There were no vitals taken for this visit.  {detention physical exam :044426}    {fgslab:656960}    Assessment/Plan:  {FGS DX2:662043}    MED REC REQUIRED{TIP  Click the link below to document or use med rec list, use list to pull in response :675006}  Post Medication Reconciliation Status: {MED REC LIST:003106}      Orders:  {fgsorders:176300}  ***    Electronically signed by: Nieves Linda MA ***         Sincerely,        MELODY Jacobsen CNP    Electronically signed

## 2025-05-21 ENCOUNTER — DOCUMENTATION ONLY (OUTPATIENT)
Dept: OTHER | Facility: CLINIC | Age: 65
End: 2025-05-21
Payer: COMMERCIAL

## 2025-05-22 NOTE — PROGRESS NOTES
[facility] Discharge Orders    Name: Lisbet Sanchez  : 1960  Planned Discharge Date: 25  Discharged to: previous independent home      MEDICAL FOLLOW UP  Follow up with primary care provider in 1-2 weeks  Follow up with specialist:   Follow-up with [facility] for new CPAP machine   Follow-up with Dr. Pond ([facility] [place]), [facility]  Cardiology      FUTURE LABS:   - Recheck BMP in 2-4 weeks with PCP due to intermittent dizziness w/hypotension       ORDER CHANGES:  - Decrease bumex to 0.5mg every day dx CHF  - Decreased lisinopril to 5mg every day due to hypotension      DISCHARGE MEDICATIONS:  The patient s pharmacy is authorized to dispense a 30-day supply of medications. Refill requests should be directed to the primary provider, Nubia Suárez   Controlled medications:   Medication: oxycodone 10mg , 30 tabs given to patient at the time of discharge to take home    Current Outpatient Medications   Medication Sig Dispense Refill    bumetanide (BUMEX) 0.5 MG tablet Take 1 tablet (0.5 mg) by mouth daily. 30 tablet 0    ARIPiprazole (ABILIFY) 30 MG tablet Take 30 mg by mouth daily.      artificial saliva (BIOTENE MT) SOLN solution Swish and spit 15 mLs in mouth every 4 hours as needed for dry mouth.      atorvastatin (LIPITOR) 10 MG tablet Take 10 mg by mouth daily.      busPIRone HCl (BUSPAR) 30 MG tablet Take 30 mg by mouth 2 times daily.      Calcium Carb-Cholecalciferol (CALCIUM 500 + D) 500-5 MG-MCG TABS Take 1 tablet by mouth daily.      clonazePAM (KLONOPIN) 0.5 MG tablet Take 1 tablet (0.5 mg) by mouth 3 times daily as needed for anxiety. 30 tablet 0    Cranberry 450 MG TABS Take 450 mg by mouth daily.      desvenlafaxine (PRISTIQ) 100 MG 24 hr tablet Take 100 mg by mouth daily.      eletriptan (RELPAX) 20 MG tablet Take 20 mg by mouth 2 times daily as needed for migraine.      gabapentin (GRALISE) 300 MG 24 hr tablet Take 300 mg by mouth 4 times daily.       hydrOXYzine HCl (ATARAX) 25 MG tablet Take 1 tablet (25 mg) by mouth 2 times daily. May also take 1 tablet (25 mg) 2 times daily as needed for itching. 60 tablet 0    lactulose (CHRONULAC) 10 GM/15ML solution Take 22.5 mLs (15 g) by mouth daily. May also take 22.5 mLs (15 g) daily as needed for constipation.      levothyroxine (SYNTHROID/LEVOTHROID) 175 MCG tablet Take 175 mcg by mouth daily.      lisinopril (ZESTRIL) 5 MG tablet Take 1 tablet (5 mg) by mouth daily. 30 tablet 0    magnesium oxide (MAG-OX) 400 MG tablet Take 400 mg by mouth 2 times daily as needed.      methocarbamol (ROBAXIN) 500 MG tablet Take 500 mg by mouth every 6 hours as needed for muscle spasms.      metoprolol succinate ER (TOPROL XL) 25 MG 24 hr tablet Take 12.5 mg by mouth daily.      multivitamin (CENTRUM SILVER) tablet Take 1 tablet by mouth daily.      naloxone (NARCAN) 4 MG/0.1ML nasal spray Spray 4 mg into one nostril alternating nostrils as needed for opioid reversal. every 2-3 minutes until assistance arrives      ondansetron (ZOFRAN ODT) 4 MG ODT tab Take 4 mg by mouth every 8 hours as needed for nausea.      oxyCODONE IR (ROXICODONE) 10 MG tablet Take 1 tablet (10 mg) by mouth every 4 hours as needed for severe pain. 60 tablet 0    pseudoePHEDrine (SUDAFED) 60 MG tablet Take 120 mg by mouth daily as needed for congestion.         SERVICES:   Home Care:  Occupational Therapy and Physical Therapy     ADDITIONAL INSTRUCTIONS:  Weigh yourself daily in the morning and keep a record. Call your primary clinic: a) if you are more short of breath, or b) if your weight changes more than 3 pounds in one day or more than 5 pounds in one week.   Notify PCP if you have a fever greater than 100.5 degrees.       MELODY Jacobsen CNP  This document was electronically signed on May 21, 2025